# Patient Record
Sex: FEMALE | Race: WHITE | Employment: FULL TIME | URBAN - METROPOLITAN AREA
[De-identification: names, ages, dates, MRNs, and addresses within clinical notes are randomized per-mention and may not be internally consistent; named-entity substitution may affect disease eponyms.]

---

## 2017-04-10 RX ORDER — LEVOTHYROXINE SODIUM 0.05 MG/1
TABLET ORAL
Qty: 90 TABLET | Refills: 0 | Status: SHIPPED | OUTPATIENT
Start: 2017-04-10 | End: 2017-07-09

## 2017-06-10 ENCOUNTER — TELEPHONE (OUTPATIENT)
Dept: INTERNAL MEDICINE CLINIC | Facility: CLINIC | Age: 52
End: 2017-06-10

## 2017-06-10 ENCOUNTER — MOBILE ENCOUNTER (OUTPATIENT)
Dept: INTERNAL MEDICINE CLINIC | Facility: CLINIC | Age: 52
End: 2017-06-10

## 2017-06-10 RX ORDER — SULFAMETHOXAZOLE AND TRIMETHOPRIM 800; 160 MG/1; MG/1
1 TABLET ORAL 2 TIMES DAILY
Qty: 6 TABLET | Refills: 0 | OUTPATIENT
Start: 2017-06-10 | End: 2017-06-13

## 2017-06-10 NOTE — TELEPHONE ENCOUNTER
Pt called with uti symptoms. Having burning with urination, urgency and frequency for a day. This am had blood in her urine. Is going to be in [place] for the weekend, on the way to the airport when she called.  Would like to have something called in fo

## 2017-07-09 DIAGNOSIS — E03.9 ACQUIRED HYPOTHYROIDISM: Primary | ICD-10-CM

## 2017-07-10 RX ORDER — LEVOTHYROXINE SODIUM 0.05 MG/1
TABLET ORAL
Qty: 90 TABLET | Refills: 0 | Status: SHIPPED | OUTPATIENT
Start: 2017-07-10 | End: 2017-10-08

## 2017-07-10 NOTE — TELEPHONE ENCOUNTER
Clarification question: Rx already states 90 days. Do you want 30 days only? Will advise pt TSH needed.

## 2017-07-10 NOTE — TELEPHONE ENCOUNTER
Last OV pertinent to medication: 5/17/16  Last refill date: 4/10/17     #/refills: 90+0  When pt was asked to return for OV: 1 year  Upcoming appt/reason: 7/17/17      TSH   Date Value   05/19/2016 3.200 mIU/mL   04/14/2015 1.72 mIU/L   ----------  TSH W/R

## 2017-07-17 ENCOUNTER — OFFICE VISIT (OUTPATIENT)
Dept: INTERNAL MEDICINE CLINIC | Facility: CLINIC | Age: 52
End: 2017-07-17

## 2017-07-17 ENCOUNTER — APPOINTMENT (OUTPATIENT)
Dept: LAB | Age: 52
End: 2017-07-17
Attending: INTERNAL MEDICINE
Payer: COMMERCIAL

## 2017-07-17 VITALS
RESPIRATION RATE: 12 BRPM | HEART RATE: 61 BPM | WEIGHT: 125 LBS | DIASTOLIC BLOOD PRESSURE: 70 MMHG | SYSTOLIC BLOOD PRESSURE: 100 MMHG | HEIGHT: 68 IN | BODY MASS INDEX: 18.94 KG/M2 | OXYGEN SATURATION: 98 %

## 2017-07-17 DIAGNOSIS — Z00.00 ROUTINE PHYSICAL EXAMINATION: Primary | ICD-10-CM

## 2017-07-17 DIAGNOSIS — Z12.31 SCREENING MAMMOGRAM, ENCOUNTER FOR: ICD-10-CM

## 2017-07-17 DIAGNOSIS — Z00.00 ROUTINE PHYSICAL EXAMINATION: ICD-10-CM

## 2017-07-17 LAB
ALBUMIN SERPL-MCNC: 4.2 G/DL (ref 3.5–4.8)
ALP LIVER SERPL-CCNC: 55 U/L (ref 41–108)
ALT SERPL-CCNC: 21 U/L (ref 14–54)
AST SERPL-CCNC: 16 U/L (ref 15–41)
BILIRUB SERPL-MCNC: 0.8 MG/DL (ref 0.1–2)
BUN BLD-MCNC: 16 MG/DL (ref 8–20)
CALCIUM BLD-MCNC: 9.8 MG/DL (ref 8.3–10.3)
CHLORIDE: 105 MMOL/L (ref 101–111)
CHOLEST SMN-MCNC: 237 MG/DL (ref ?–200)
CO2: 28 MMOL/L (ref 22–32)
CREAT BLD-MCNC: 0.71 MG/DL (ref 0.55–1.02)
FREE T4: 1 NG/DL (ref 0.9–1.8)
GLUCOSE BLD-MCNC: 97 MG/DL (ref 70–99)
HDLC SERPL-MCNC: 83 MG/DL (ref 45–?)
HDLC SERPL: 2.86 {RATIO} (ref ?–4.44)
LDLC SERPL CALC-MCNC: 126 MG/DL (ref ?–130)
LDLC SERPL-MCNC: 28 MG/DL (ref 5–40)
M PROTEIN MFR SERPL ELPH: 7.5 G/DL (ref 6.1–8.3)
NONHDLC SERPL-MCNC: 154 MG/DL (ref ?–130)
POTASSIUM SERPL-SCNC: 4.4 MMOL/L (ref 3.6–5.1)
SODIUM SERPL-SCNC: 139 MMOL/L (ref 136–144)
TRIGLYCERIDES: 138 MG/DL (ref ?–150)
TSI SER-ACNC: 1.94 MIU/ML (ref 0.35–5.5)

## 2017-07-17 PROCEDURE — 84443 ASSAY THYROID STIM HORMONE: CPT

## 2017-07-17 PROCEDURE — 99396 PREV VISIT EST AGE 40-64: CPT | Performed by: INTERNAL MEDICINE

## 2017-07-17 PROCEDURE — 36415 COLL VENOUS BLD VENIPUNCTURE: CPT

## 2017-07-17 PROCEDURE — 80061 LIPID PANEL: CPT

## 2017-07-17 PROCEDURE — 84439 ASSAY OF FREE THYROXINE: CPT

## 2017-07-17 PROCEDURE — 80053 COMPREHEN METABOLIC PANEL: CPT

## 2017-07-17 NOTE — PROGRESS NOTES
HPI:   Deepa Lares is a 46year old female who presents for a complete physical exam.    Wt Readings from Last 6 Encounters:  07/17/17 : 125 lb  08/25/16 : 130 lb  05/17/16 : 129 lb  09/09/15 : 124 lb  04/08/15 : 125 lb  12/20/13 : 121 lb    Body mass i smoking during college  Alcohol use: Yes           0.0 oz/week     Comment: 1 drink daily     Occ: work [employer] . : yes Children: one adult    Exercise: yoga twice a week/long walks.   Diet: healthy      REVIEW OF SYSTEMS:   GENERAL: feels well clubbing or edema, right thumb no trigger, slight swelling, nontender  NEURO: Oriented times three,cranial nerves are intact, 5/5 upper and lower extremity strength, reflexes 2+ symmetric upper and lower      ASSESSMENT AND PLAN:   Kirk Mercadoma is a 46 y

## 2017-07-29 ENCOUNTER — HOSPITAL ENCOUNTER (OUTPATIENT)
Dept: MAMMOGRAPHY | Age: 52
Discharge: HOME | End: 2017-07-29
Attending: INTERNAL MEDICINE
Payer: COMMERCIAL

## 2017-07-29 DIAGNOSIS — Z12.31 SCREENING MAMMOGRAM, ENCOUNTER FOR: ICD-10-CM

## 2017-07-29 PROCEDURE — 77067 SCR MAMMO BI INCL CAD: CPT | Performed by: INTERNAL MEDICINE

## 2017-10-09 RX ORDER — LEVOTHYROXINE SODIUM 0.05 MG/1
TABLET ORAL
Qty: 90 TABLET | Refills: 0 | Status: SHIPPED | OUTPATIENT
Start: 2017-10-09 | End: 2018-01-07

## 2018-01-08 RX ORDER — LEVOTHYROXINE SODIUM 0.05 MG/1
TABLET ORAL
Qty: 90 TABLET | Refills: 1 | Status: SHIPPED | OUTPATIENT
Start: 2018-01-08 | End: 2018-07-23

## 2018-07-09 RX ORDER — LEVOTHYROXINE SODIUM 0.05 MG/1
TABLET ORAL
Qty: 90 TABLET | Refills: 1 | OUTPATIENT
Start: 2018-07-09

## 2018-07-09 NOTE — TELEPHONE ENCOUNTER
Last OV relevant to medication: 7/17/17  Last refill date: 1/8/18  #/90refills: 1  When pt was asked to return for OV:1 year  Upcoming appt/reason: No appt scheduled. Spoke with pt. She scheduled OV with JV for medication follow up.   She doesn't need re

## 2018-07-23 ENCOUNTER — OFFICE VISIT (OUTPATIENT)
Dept: INTERNAL MEDICINE CLINIC | Facility: CLINIC | Age: 53
End: 2018-07-23
Payer: COMMERCIAL

## 2018-07-23 ENCOUNTER — LABORATORY ENCOUNTER (OUTPATIENT)
Dept: LAB | Age: 53
End: 2018-07-23
Attending: NURSE PRACTITIONER
Payer: COMMERCIAL

## 2018-07-23 VITALS
BODY MASS INDEX: 19.58 KG/M2 | WEIGHT: 129.19 LBS | RESPIRATION RATE: 14 BRPM | HEIGHT: 68 IN | DIASTOLIC BLOOD PRESSURE: 60 MMHG | SYSTOLIC BLOOD PRESSURE: 90 MMHG | HEART RATE: 64 BPM | OXYGEN SATURATION: 97 % | TEMPERATURE: 99 F

## 2018-07-23 DIAGNOSIS — Z13.0 SCREENING FOR BLOOD DISEASE: ICD-10-CM

## 2018-07-23 DIAGNOSIS — Z13.228 SCREENING FOR METABOLIC DISORDER: ICD-10-CM

## 2018-07-23 DIAGNOSIS — E03.9 ACQUIRED HYPOTHYROIDISM: Primary | ICD-10-CM

## 2018-07-23 DIAGNOSIS — E03.9 ACQUIRED HYPOTHYROIDISM: ICD-10-CM

## 2018-07-23 DIAGNOSIS — Z13.220 SCREENING FOR LIPID DISORDERS: ICD-10-CM

## 2018-07-23 DIAGNOSIS — M79.605 ACUTE LEG PAIN, LEFT: ICD-10-CM

## 2018-07-23 DIAGNOSIS — Z12.39 SCREENING FOR BREAST CANCER: ICD-10-CM

## 2018-07-23 LAB
ALBUMIN SERPL-MCNC: 4.2 G/DL (ref 3.5–4.8)
ALBUMIN/GLOB SERPL: 1.2 {RATIO} (ref 1–2)
ALP LIVER SERPL-CCNC: 57 U/L (ref 41–108)
ALT SERPL-CCNC: 23 U/L (ref 14–54)
ANION GAP SERPL CALC-SCNC: 6 MMOL/L (ref 0–18)
AST SERPL-CCNC: 15 U/L (ref 15–41)
BASOPHILS # BLD AUTO: 0.05 X10(3) UL (ref 0–0.1)
BASOPHILS NFR BLD AUTO: 0.9 %
BILIRUB SERPL-MCNC: 0.7 MG/DL (ref 0.1–2)
BUN BLD-MCNC: 16 MG/DL (ref 8–20)
BUN/CREAT SERPL: 20.3 (ref 10–20)
CALCIUM BLD-MCNC: 9.6 MG/DL (ref 8.3–10.3)
CHLORIDE SERPL-SCNC: 106 MMOL/L (ref 101–111)
CHOLEST SMN-MCNC: 229 MG/DL (ref ?–200)
CO2 SERPL-SCNC: 29 MMOL/L (ref 22–32)
CREAT BLD-MCNC: 0.79 MG/DL (ref 0.55–1.02)
EOSINOPHIL # BLD AUTO: 0.22 X10(3) UL (ref 0–0.3)
EOSINOPHIL NFR BLD AUTO: 4.1 %
ERYTHROCYTE [DISTWIDTH] IN BLOOD BY AUTOMATED COUNT: 12.9 % (ref 11.5–16)
GLOBULIN PLAS-MCNC: 3.4 G/DL (ref 2.5–3.7)
GLUCOSE BLD-MCNC: 89 MG/DL (ref 70–99)
HCT VFR BLD AUTO: 43.4 % (ref 34–50)
HDLC SERPL-MCNC: 72 MG/DL (ref 40–59)
HGB BLD-MCNC: 14.3 G/DL (ref 12–16)
IMMATURE GRANULOCYTE COUNT: 0.02 X10(3) UL (ref 0–1)
IMMATURE GRANULOCYTE RATIO %: 0.4 %
LDLC SERPL CALC-MCNC: 119 MG/DL (ref ?–100)
LYMPHOCYTES # BLD AUTO: 2.33 X10(3) UL (ref 0.9–4)
LYMPHOCYTES NFR BLD AUTO: 43 %
M PROTEIN MFR SERPL ELPH: 7.6 G/DL (ref 6.1–8.3)
MCH RBC QN AUTO: 31.5 PG (ref 27–33.2)
MCHC RBC AUTO-ENTMCNC: 32.9 G/DL (ref 31–37)
MCV RBC AUTO: 95.6 FL (ref 81–100)
MONOCYTES # BLD AUTO: 0.52 X10(3) UL (ref 0.1–1)
MONOCYTES NFR BLD AUTO: 9.6 %
NEUTROPHIL ABS PRELIM: 2.28 X10 (3) UL (ref 1.3–6.7)
NEUTROPHILS # BLD AUTO: 2.28 X10(3) UL (ref 1.3–6.7)
NEUTROPHILS NFR BLD AUTO: 42 %
NONHDLC SERPL-MCNC: 157 MG/DL (ref ?–130)
OSMOLALITY SERPL CALC.SUM OF ELEC: 293 MOSM/KG (ref 275–295)
PLATELET # BLD AUTO: 249 10(3)UL (ref 150–450)
POTASSIUM SERPL-SCNC: 4 MMOL/L (ref 3.6–5.1)
RBC # BLD AUTO: 4.54 X10(6)UL (ref 3.8–5.1)
RED CELL DISTRIBUTION WIDTH-SD: 44.9 FL (ref 35.1–46.3)
SODIUM SERPL-SCNC: 141 MMOL/L (ref 136–144)
TRIGL SERPL-MCNC: 188 MG/DL (ref 30–149)
TSI SER-ACNC: 5 MIU/ML (ref 0.35–5.5)
VLDLC SERPL CALC-MCNC: 38 MG/DL (ref 0–30)
WBC # BLD AUTO: 5.4 X10(3) UL (ref 4–13)

## 2018-07-23 PROCEDURE — 80061 LIPID PANEL: CPT

## 2018-07-23 PROCEDURE — 85025 COMPLETE CBC W/AUTO DIFF WBC: CPT

## 2018-07-23 PROCEDURE — 80053 COMPREHEN METABOLIC PANEL: CPT

## 2018-07-23 PROCEDURE — 36415 COLL VENOUS BLD VENIPUNCTURE: CPT

## 2018-07-23 PROCEDURE — 99213 OFFICE O/P EST LOW 20 MIN: CPT | Performed by: NURSE PRACTITIONER

## 2018-07-23 PROCEDURE — 84443 ASSAY THYROID STIM HORMONE: CPT

## 2018-07-23 RX ORDER — LEVOTHYROXINE SODIUM 0.05 MG/1
50 TABLET ORAL
Qty: 90 TABLET | Refills: 1 | Status: SHIPPED | OUTPATIENT
Start: 2018-07-23 | End: 2019-09-12

## 2018-07-23 RX ORDER — CHLORAL HYDRATE 500 MG
1000 CAPSULE ORAL DAILY
COMMUNITY

## 2018-07-23 RX ORDER — BIOTIN 10 MG
2500 TABLET ORAL DAILY
COMMUNITY
End: 2020-08-19 | Stop reason: ALTCHOICE

## 2018-07-23 NOTE — PROGRESS NOTES
Patient presents with:  Medication Follow-Up      HPI:  Presents with approx 3 month history of left posterior leg pain, feel she \"pulled\" it while exercising. Denies redness, swelling weakness of leg. Has been treating with rest with little improvement. A/P:    Acquired hypothyroidism  (primary encounter diagnosis)- Check TSH. Continue levothyroxine for now. Acute leg pain, left- trial Aleve BID as below (refused script for naproxen). Call office if no improvement.      Patient to schedule CPE soon

## 2018-07-23 NOTE — PATIENT INSTRUCTIONS
Take Aleve 2 tabs, twice daily, for 1 week. Space doses 12 hours apart for best effect. TAKE WITH FOOD. Do not take any Advil, Motrin or ibuprofen products while taking this medication.          It is ok to take acetaminophen (Tyle

## 2018-07-26 PROBLEM — E78.5 DYSLIPIDEMIA: Status: ACTIVE | Noted: 2018-07-26

## 2018-07-27 ENCOUNTER — OFFICE VISIT (OUTPATIENT)
Dept: INTERNAL MEDICINE CLINIC | Facility: CLINIC | Age: 53
End: 2018-07-27
Payer: COMMERCIAL

## 2018-07-27 VITALS
HEIGHT: 68 IN | WEIGHT: 129 LBS | BODY MASS INDEX: 19.55 KG/M2 | DIASTOLIC BLOOD PRESSURE: 74 MMHG | SYSTOLIC BLOOD PRESSURE: 110 MMHG | TEMPERATURE: 99 F | RESPIRATION RATE: 14 BRPM | HEART RATE: 66 BPM | OXYGEN SATURATION: 94 %

## 2018-07-27 DIAGNOSIS — Z00.00 WELL WOMAN EXAM (NO GYNECOLOGICAL EXAM): Primary | ICD-10-CM

## 2018-07-27 DIAGNOSIS — E03.9 ACQUIRED HYPOTHYROIDISM: ICD-10-CM

## 2018-07-27 DIAGNOSIS — E78.5 DYSLIPIDEMIA: ICD-10-CM

## 2018-07-27 DIAGNOSIS — M79.605 LEFT LEG PAIN: ICD-10-CM

## 2018-07-27 PROCEDURE — 99396 PREV VISIT EST AGE 40-64: CPT | Performed by: NURSE PRACTITIONER

## 2018-07-27 NOTE — PROGRESS NOTES
Shalom Gil is a 45year old female who presents for a complete physical exam:       Patient complains of:  Left hamstring pain for several months. Is typically very active with yoga and jogging. Active has been limited due to soreness.  Denies left leg daily. Disp:  Rfl:    Rhodiola rosea (RHODIOLA OR) Take 250 mg by mouth daily. Disp:  Rfl:    Biotin 10 MG Oral Tab Take 2,500 mcg by mouth daily. Disp:  Rfl:    Levothyroxine Sodium 50 MCG Oral Tab Take 1 tablet (50 mcg total) by mouth once daily.  Disp: completed. REVIEW OF SYSTEMS:   GENERAL: feels well otherwise.  No fever, chills, malaise  SKIN: denies any unusual skin lesions, rash or pruritis  EYES: denies blurred/double vision, light sensitivity or visual disturbances  HEENT: denies nasal congest MAINTENANCE:     Well woman exam (no gynecological exam)  (primary encounter diagnosis)-  adult female in her usual state of health. Encouraged to eat healthy diet as tolerated.      Left leg pain- discussed resuming activity at low levels and rebu

## 2018-07-30 ENCOUNTER — PATIENT MESSAGE (OUTPATIENT)
Dept: INTERNAL MEDICINE CLINIC | Facility: CLINIC | Age: 53
End: 2018-07-30

## 2018-07-31 NOTE — TELEPHONE ENCOUNTER
From: Jaylen Fajardo  To: Tita Sanchez NP  Sent: 7/30/2018 11:24 AM CDT  Subject: Visit Follow-up Question    Coral Massey,    During my visit last Friday 7/27 we discussed my borderline cholesterol and the potential long-term benefits of statins.  I am in

## 2018-08-09 RX ORDER — ROSUVASTATIN CALCIUM 5 MG/1
5 TABLET, COATED ORAL NIGHTLY
Qty: 90 TABLET | Refills: 0 | Status: SHIPPED | OUTPATIENT
Start: 2018-08-09 | End: 2018-10-25

## 2018-08-09 NOTE — TELEPHONE ENCOUNTER
See patient email. Patient has decided to try the Rosuvastatin. Medication pended to you for 5mg, 90 day supply to mail order. Please double check before approving. Any labs to be ordered? Thank you. Routed to [facility].

## 2018-08-20 ENCOUNTER — TELEPHONE (OUTPATIENT)
Dept: INTERNAL MEDICINE CLINIC | Facility: CLINIC | Age: 53
End: 2018-08-20

## 2018-08-20 ENCOUNTER — OFFICE VISIT (OUTPATIENT)
Dept: FAMILY MEDICINE CLINIC | Facility: CLINIC | Age: 53
End: 2018-08-20
Payer: COMMERCIAL

## 2018-08-20 VITALS
HEART RATE: 80 BPM | DIASTOLIC BLOOD PRESSURE: 76 MMHG | RESPIRATION RATE: 18 BRPM | SYSTOLIC BLOOD PRESSURE: 118 MMHG | TEMPERATURE: 98 F

## 2018-08-20 DIAGNOSIS — R31.9 HEMATURIA, UNSPECIFIED TYPE: Primary | ICD-10-CM

## 2018-08-20 LAB
APPEARANCE: CLEAR
BILIRUBIN: NEGATIVE
GLUCOSE (URINE DIPSTICK): NEGATIVE MG/DL
KETONES (URINE DIPSTICK): NEGATIVE MG/DL
MULTISTIX LOT#: NORMAL NUMERIC
NITRITE, URINE: NEGATIVE
OCCULT BLOOD: NEGATIVE
PH, URINE: 7 (ref 4.5–8)
PROTEIN (URINE DIPSTICK): NEGATIVE MG/DL
SPECIFIC GRAVITY: 1.01 (ref 1–1.03)
UROBILINOGEN,SEMI-QN: 0.2 MG/DL (ref 0–1.9)

## 2018-08-20 PROCEDURE — 87086 URINE CULTURE/COLONY COUNT: CPT | Performed by: NURSE PRACTITIONER

## 2018-08-20 PROCEDURE — 81003 URINALYSIS AUTO W/O SCOPE: CPT | Performed by: NURSE PRACTITIONER

## 2018-08-20 PROCEDURE — 99213 OFFICE O/P EST LOW 20 MIN: CPT | Performed by: NURSE PRACTITIONER

## 2018-08-20 NOTE — PATIENT INSTRUCTIONS
What is Hematuria? Blood in your urine is a condition known as hematuria. Most of the time, the cause of hematuria is not serious. But, never ignore blood in the urine.  Your doctor can evaluate you to find the cause of the bleeding and treat it, if need © 0722-8857 [employer] [address] [facility], [facility] [place]. All rights reserved. This information is not intended as a substitute for professional medical care. Always follow your healthcare professional's instructions.

## 2018-08-20 NOTE — PROGRESS NOTES
CHIEF COMPLAINT:   Patient presents with:  Urinary Symptoms (urologic): had 3 episodes of pink urine, and spotting on toliet paper. HPI:   Joel Leavitt is a 45year old female who presents with symptoms of UTI.  Complaining of pink urine with visib GENERAL: Denies fever, chills, or body aches; usual appetite  SKIN: no rashes, no skin wounds or ulcers.   EYES:denies blurred vision or double vision  HEENT: no congestion, rhinorrhea, sore throat or ear pain  CARDIOVASCULAR: denies chest pain or palpitati Gualberto Gil is a 45year old female presents with hematuria 3 days ago and again this morning, no blood  present in urine dip. Denies urinary frequency, urgency and burning. Before, during or after urination.  Will send urine specimen for C/S and treat as Both types of hematuria can have the same causes. Neither one is necessarily more serious than the other. With either type, you may have other symptoms, such as pain, pressure, or burning when you urinate, abdominal pain, or back pain.  Or, you may not have

## 2018-08-20 NOTE — TELEPHONE ENCOUNTER
Spoke with pt, complaint of \"pink blood\" which she states is usually first sign that UTI starting. Denied any other urinary complaints. Pt needed late afternoon/evening appt. No immediate availability.  Recommended to go to [facility] as they have later hours an

## 2018-08-22 ENCOUNTER — OFFICE VISIT (OUTPATIENT)
Dept: OBGYN CLINIC | Facility: CLINIC | Age: 53
End: 2018-08-22
Payer: COMMERCIAL

## 2018-08-22 ENCOUNTER — TELEPHONE (OUTPATIENT)
Dept: FAMILY MEDICINE CLINIC | Facility: CLINIC | Age: 53
End: 2018-08-22

## 2018-08-22 VITALS
DIASTOLIC BLOOD PRESSURE: 72 MMHG | BODY MASS INDEX: 19.7 KG/M2 | WEIGHT: 130 LBS | RESPIRATION RATE: 20 BRPM | HEIGHT: 68 IN | HEART RATE: 60 BPM | SYSTOLIC BLOOD PRESSURE: 110 MMHG | TEMPERATURE: 99 F

## 2018-08-22 DIAGNOSIS — Z12.4 SCREENING FOR MALIGNANT NEOPLASM OF CERVIX: ICD-10-CM

## 2018-08-22 DIAGNOSIS — Z01.419 ENCOUNTER FOR ANNUAL ROUTINE GYNECOLOGICAL EXAMINATION: Primary | ICD-10-CM

## 2018-08-22 DIAGNOSIS — N89.8 VAGINAL ITCHING: ICD-10-CM

## 2018-08-22 DIAGNOSIS — Z11.51 SCREENING FOR HUMAN PAPILLOMAVIRUS: ICD-10-CM

## 2018-08-22 PROCEDURE — 87510 GARDNER VAG DNA DIR PROBE: CPT | Performed by: OBSTETRICS & GYNECOLOGY

## 2018-08-22 PROCEDURE — 87624 HPV HI-RISK TYP POOLED RSLT: CPT | Performed by: OBSTETRICS & GYNECOLOGY

## 2018-08-22 PROCEDURE — 88175 CYTOPATH C/V AUTO FLUID REDO: CPT | Performed by: OBSTETRICS & GYNECOLOGY

## 2018-08-22 PROCEDURE — 87480 CANDIDA DNA DIR PROBE: CPT | Performed by: OBSTETRICS & GYNECOLOGY

## 2018-08-22 PROCEDURE — 87660 TRICHOMONAS VAGIN DIR PROBE: CPT | Performed by: OBSTETRICS & GYNECOLOGY

## 2018-08-22 PROCEDURE — 99386 PREV VISIT NEW AGE 40-64: CPT | Performed by: OBSTETRICS & GYNECOLOGY

## 2018-08-22 NOTE — PROGRESS NOTES
HPI:   Alexander Tyson is a 45year old  who presents for an annual gynecological exam.   Menses: Patient's last menstrual period was 2017. Menopause: postmenopausal  Her last Pap was 3 years ago.   Several weeks ago when she wiped she noticed a sp Cigarettes  Smokeless tobacco: Never Used                      Comment: social smoking during college  Alcohol use: Yes           0.0 oz/week     Comment: x2 drinks every 3 days       REVIEW OF SYSTEMS:   CONSTITUTIONAL: generally feels well   SKIN: denies globular, well supported  ADNEXAE: Normal, no masses, no tenderness  ANUS: No lesions, no masses  PSYCHIATRIC: Patient oriented to time, place and person; mood and affect appropriate    DISCUSSION:  ·  I encouraged incorporating 4x weekly cardiovascular ac Future    Instructed to be seen should she notice any more streaks of blood on toilet paper with wiping

## 2018-08-23 LAB — HPV I/H RISK 1 DNA SPEC QL NAA+PROBE: NEGATIVE

## 2018-09-01 ENCOUNTER — HOSPITAL ENCOUNTER (OUTPATIENT)
Dept: MAMMOGRAPHY | Age: 53
Discharge: HOME | End: 2018-09-01
Attending: NURSE PRACTITIONER
Payer: COMMERCIAL

## 2018-09-01 DIAGNOSIS — Z12.39 SCREENING FOR BREAST CANCER: ICD-10-CM

## 2018-09-01 PROCEDURE — 77063 BREAST TOMOSYNTHESIS BI: CPT | Performed by: NURSE PRACTITIONER

## 2018-09-01 PROCEDURE — 77067 SCR MAMMO BI INCL CAD: CPT | Performed by: NURSE PRACTITIONER

## 2018-10-26 RX ORDER — ROSUVASTATIN CALCIUM 5 MG/1
5 TABLET, COATED ORAL NIGHTLY
Qty: 90 TABLET | Refills: 0 | Status: SHIPPED | OUTPATIENT
Start: 2018-10-26 | End: 2019-02-22

## 2018-10-29 RX ORDER — ROSUVASTATIN CALCIUM 5 MG/1
TABLET, COATED ORAL
Qty: 90 TABLET | Refills: 0 | OUTPATIENT
Start: 2018-10-29

## 2019-01-22 ENCOUNTER — OFFICE VISIT (OUTPATIENT)
Dept: FAMILY MEDICINE CLINIC | Facility: CLINIC | Age: 54
End: 2019-01-22
Payer: COMMERCIAL

## 2019-01-22 VITALS
TEMPERATURE: 98 F | RESPIRATION RATE: 16 BRPM | DIASTOLIC BLOOD PRESSURE: 70 MMHG | OXYGEN SATURATION: 98 % | HEART RATE: 71 BPM | SYSTOLIC BLOOD PRESSURE: 139 MMHG

## 2019-01-22 DIAGNOSIS — N30.90 BLADDER INFECTION: ICD-10-CM

## 2019-01-22 DIAGNOSIS — R30.0 DYSURIA: Primary | ICD-10-CM

## 2019-01-22 LAB
APPEARANCE: CLEAR
BILIRUBIN: NEGATIVE
GLUCOSE (URINE DIPSTICK): NEGATIVE MG/DL
KETONES (URINE DIPSTICK): NEGATIVE MG/DL
MULTISTIX LOT#: ABNORMAL NUMERIC
NITRITE, URINE: NEGATIVE
PH, URINE: 6.5 (ref 4.5–8)
SPECIFIC GRAVITY: 1.01 (ref 1–1.03)
URINE-COLOR: YELLOW
UROBILINOGEN,SEMI-QN: 0.2 MG/DL (ref 0–1.9)

## 2019-01-22 PROCEDURE — 81003 URINALYSIS AUTO W/O SCOPE: CPT | Performed by: NURSE PRACTITIONER

## 2019-01-22 PROCEDURE — 87086 URINE CULTURE/COLONY COUNT: CPT | Performed by: NURSE PRACTITIONER

## 2019-01-22 PROCEDURE — 87088 URINE BACTERIA CULTURE: CPT | Performed by: NURSE PRACTITIONER

## 2019-01-22 PROCEDURE — 87186 SC STD MICRODIL/AGAR DIL: CPT | Performed by: NURSE PRACTITIONER

## 2019-01-22 PROCEDURE — 99213 OFFICE O/P EST LOW 20 MIN: CPT | Performed by: NURSE PRACTITIONER

## 2019-01-22 RX ORDER — NITROFURANTOIN 25; 75 MG/1; MG/1
CAPSULE ORAL
Qty: 14 CAPSULE | Refills: 0 | Status: SHIPPED | OUTPATIENT
Start: 2019-01-22 | End: 2019-08-30 | Stop reason: ALTCHOICE

## 2019-01-22 NOTE — PROGRESS NOTES
CHIEF COMPLAINT:   Patient presents with:  UTI: Symptoms x3 days. HPI:   Oanh Paul is a 52year old female who presents with symptoms of UTI. Complaining of urinary frequency, urgency, and dysuria for last 3 days.  Symptoms have been worseninng si HEENT: no congestion, rhinorrhea, sore throat or ear pain  CARDIOVASCULAR: denies chest pain or palpitations  LUNGS: denies shortness of breath, cough, or wheezing  GI: See HPI. No N/V/C/D. : See HPI. NEURO: no headaches.     EXAM:   /70   Pulse • Nitrofurantoin Monohyd Macro 100 MG Oral Cap 14 capsule 0     Sig: Take one capsule PO BID for 7 days       Risk and benefits of medication discussed. Stressed importance of completing full course of antibiotic. Pt verbalizes understanding.     Patient In The most common cause of bladder infections is bacteria from the bowels. The bacteria get onto the skin around the opening of the urethra. From there, they can get into the urine and travel up to the bladder, causing inflammation and infection.  This usuall · Urinate more often. Don't try to hold urine in for a long time. · Wear loose-fitting clothes and cotton underwear. Avoid tight-fitting pants. · Improve your diet and prevent constipation.  Eat more fresh fruit and vegetables, and fiber, and less junk an

## 2019-02-22 RX ORDER — ROSUVASTATIN CALCIUM 5 MG/1
5 TABLET, COATED ORAL NIGHTLY
Qty: 90 TABLET | Refills: 0 | Status: CANCELLED | OUTPATIENT
Start: 2019-02-22

## 2019-02-22 RX ORDER — ROSUVASTATIN CALCIUM 5 MG/1
5 TABLET, COATED ORAL NIGHTLY
Qty: 90 TABLET | Refills: 0 | Status: SHIPPED | OUTPATIENT
Start: 2019-02-22 | End: 2019-06-05

## 2019-04-17 RX ORDER — ROSUVASTATIN CALCIUM 5 MG/1
TABLET, COATED ORAL
Qty: 90 TABLET | Refills: 0 | OUTPATIENT
Start: 2019-04-17

## 2019-06-05 DIAGNOSIS — E78.5 DYSLIPIDEMIA: Primary | ICD-10-CM

## 2019-06-05 RX ORDER — ROSUVASTATIN CALCIUM 5 MG/1
TABLET, COATED ORAL
Qty: 90 TABLET | Refills: 0 | Status: SHIPPED | OUTPATIENT
Start: 2019-06-05 | End: 2019-09-12

## 2019-07-29 DIAGNOSIS — E78.5 DYSLIPIDEMIA: ICD-10-CM

## 2019-07-29 RX ORDER — ROSUVASTATIN CALCIUM 5 MG/1
TABLET, COATED ORAL
Qty: 90 TABLET | Refills: 0 | OUTPATIENT
Start: 2019-07-29

## 2019-08-30 ENCOUNTER — OFFICE VISIT (OUTPATIENT)
Dept: INTERNAL MEDICINE CLINIC | Facility: CLINIC | Age: 54
End: 2019-08-30
Payer: COMMERCIAL

## 2019-08-30 VITALS
DIASTOLIC BLOOD PRESSURE: 72 MMHG | WEIGHT: 130.38 LBS | HEART RATE: 56 BPM | OXYGEN SATURATION: 95 % | SYSTOLIC BLOOD PRESSURE: 114 MMHG | RESPIRATION RATE: 14 BRPM | HEIGHT: 67.25 IN | BODY MASS INDEX: 20.23 KG/M2

## 2019-08-30 DIAGNOSIS — Z13.0 SCREENING FOR IRON DEFICIENCY ANEMIA: ICD-10-CM

## 2019-08-30 DIAGNOSIS — Z00.00 ENCOUNTER FOR ANNUAL PHYSICAL EXAM: Primary | ICD-10-CM

## 2019-08-30 DIAGNOSIS — Z13.228 SCREENING FOR METABOLIC DISORDER: ICD-10-CM

## 2019-08-30 DIAGNOSIS — E55.9 VITAMIN D DEFICIENCY: ICD-10-CM

## 2019-08-30 DIAGNOSIS — E78.5 DYSLIPIDEMIA: ICD-10-CM

## 2019-08-30 DIAGNOSIS — Z13.1 SCREENING FOR DIABETES MELLITUS: ICD-10-CM

## 2019-08-30 DIAGNOSIS — Z12.31 ENCOUNTER FOR SCREENING MAMMOGRAM FOR BREAST CANCER: ICD-10-CM

## 2019-08-30 DIAGNOSIS — E03.9 ACQUIRED HYPOTHYROIDISM: ICD-10-CM

## 2019-08-30 PROBLEM — N89.8 VAGINAL ITCHING: Status: RESOLVED | Noted: 2018-08-22 | Resolved: 2019-08-30

## 2019-08-30 PROCEDURE — 99396 PREV VISIT EST AGE 40-64: CPT | Performed by: STUDENT IN AN ORGANIZED HEALTH CARE EDUCATION/TRAINING PROGRAM

## 2019-08-30 NOTE — PROGRESS NOTES
[facility]    REASON FOR VISIT:    Real Andrew is a 62year old female who presents for an [address]. This is a new patient to me.    Patient's medications, allergies, past medical, surgical, social and family histories were rev with Risk Recommendation Internal Lab or Procedure External Lab or Procedure   Cholesterol Screening Recommended screening varies with age, risk and gender LDL Cholesterol (mg/dL)   Date Value   07/23/2018 119 (H)     LDL-CHOLESTEROL (mg/dL (calc))   Date COLONOSCOPY  1/17/14      Family History   Problem Relation Age of Onset   • Breast Cancer Paternal Aunt 43   • Diabetes Father         type 2 DM   • Other (gout) Father         like vodka   • Diabetes Mother    • Lipids Mother    • Dementia Mother    • Ot appears her stated age, nourished, and pleasant. Vital signs reviewed as noted  Head: Normocephalic and atraumatic. Nose: Nose normal.   Eyes: EOM are normal. Pupils are equal, round, and reactive to light. No scleral icterus.    Throat: no oropharyngeal immunizations were discussed with the patient and ordered as follows:    Rai Lucero was seen today for physical.    Diagnoses and all orders for this visit:    Encounter for annual physical exam  Body mass index is Body mass index is 20.27 kg/m².   Recommended The patient indicates understanding of these issues and agrees to the plan.     Diet counseling perfomed  Exercise counseling perfomed    SUGGESTED VACCINATIONS - Influenza, Pneumococcal, Zoster, Tetanus     Immunization History   Administered Date(s) Adm

## 2019-08-30 NOTE — PATIENT INSTRUCTIONS
Prevention Guidelines, Women Ages 48 to 59  Screening tests and vaccines are an important part of managing your health. A screening test is done to find possible disorders or diseases in people who don't have any symptoms.  The goal is to find a disea Chlamydia Women at increased risk for infection At routine exams   Colorectal cancer All women in this age group Flexible sigmoidoscopy every 5 years, or colonoscopy every 10 years, or double-contrast barium enema every 5 years; yearly fecal occult blood t Hepatitis B Women at increased risk for infection – talk with your healthcare provider 3 doses over 6 months; second dose should be given 1 month after the first dose; the third dose should be given at least 2 months after the second dose and at least 4 mo Use of daily aspirin Women ages 54 and up in this age group who are at risk for cardiovascular health problems such as stroke When your risk is known   Use of tobacco and the health effects it can cause All women in this age group Every exam   [address]

## 2019-08-31 LAB
ABSOLUTE BASOPHILS: 31 CELLS/UL (ref 0–200)
ABSOLUTE EOSINOPHILS: 120 CELLS/UL (ref 15–500)
ABSOLUTE LYMPHOCYTES: 2319 CELLS/UL (ref 850–3900)
ABSOLUTE MONOCYTES: 494 CELLS/UL (ref 200–950)
ABSOLUTE NEUTROPHILS: 2236 CELLS/UL (ref 1500–7800)
ALBUMIN/GLOBULIN RATIO: 1.9 (CALC) (ref 1–2.5)
ALBUMIN: 4.8 G/DL (ref 3.6–5.1)
ALKALINE PHOSPHATASE: 58 U/L (ref 33–130)
ALT: 14 U/L (ref 6–29)
AST: 16 U/L (ref 10–35)
BASOPHILS: 0.6 %
BILIRUBIN, TOTAL: 0.9 MG/DL (ref 0.2–1.2)
BUN: 18 MG/DL (ref 7–25)
CALCIUM: 10.2 MG/DL (ref 8.6–10.4)
CARBON DIOXIDE: 27 MMOL/L (ref 20–32)
CHLORIDE: 100 MMOL/L (ref 98–110)
CHOL/HDLC RATIO: 2.3 (CALC)
CHOLESTEROL, TOTAL: 194 MG/DL
CREATININE: 0.78 MG/DL (ref 0.5–1.05)
EGFR IF AFRICN AM: 98 ML/MIN/1.73M2
EGFR IF NONAFRICN AM: 85 ML/MIN/1.73M2
EOSINOPHILS: 2.3 %
GLOBULIN: 2.5 G/DL (CALC) (ref 1.9–3.7)
GLUCOSE: 99 MG/DL (ref 65–99)
HDL CHOLESTEROL: 84 MG/DL
HEMATOCRIT: 42 % (ref 35–45)
HEMOGLOBIN A1C: 5.1 % OF TOTAL HGB
HEMOGLOBIN: 14.2 G/DL (ref 11.7–15.5)
LDL-CHOLESTEROL: 87 MG/DL (CALC)
LYMPHOCYTES: 44.6 %
MCH: 30.7 PG (ref 27–33)
MCHC: 33.8 G/DL (ref 32–36)
MCV: 90.7 FL (ref 80–100)
MONOCYTES: 9.5 %
MPV: 11.4 FL (ref 7.5–12.5)
NEUTROPHILS: 43 %
NON-HDL CHOLESTEROL: 110 MG/DL (CALC)
PLATELET COUNT: 239 THOUSAND/UL (ref 140–400)
POTASSIUM: 4.4 MMOL/L (ref 3.5–5.3)
PROTEIN, TOTAL: 7.3 G/DL (ref 6.1–8.1)
RDW: 12.8 % (ref 11–15)
RED BLOOD CELL COUNT: 4.63 MILLION/UL (ref 3.8–5.1)
SODIUM: 136 MMOL/L (ref 135–146)
TRIGLYCERIDES: 124 MG/DL
TSH W/REFLEX TO FT4: 1.51 MIU/L (ref 0.4–4.5)
VITAMIN D, 25-OH, TOTAL: 27 NG/ML (ref 30–100)
WHITE BLOOD CELL COUNT: 5.2 THOUSAND/UL (ref 3.8–10.8)

## 2019-09-03 ENCOUNTER — PATIENT MESSAGE (OUTPATIENT)
Dept: INTERNAL MEDICINE CLINIC | Facility: CLINIC | Age: 54
End: 2019-09-03

## 2019-09-03 NOTE — TELEPHONE ENCOUNTER
From: Jarad Gordon  To: Sandrine Christensen MD  Sent: 9/3/2019 8:32 AM CDT  Subject: Non-Urgent Medical Question    I have a question about COMP METABOLIC PANEL (14) resulted on 8/31/19, 6:12 AM.    Any concern with the glucose level at the upper limit?  Hist

## 2019-09-03 NOTE — PROGRESS NOTES
Pt has 2 concerns regarding labs:    1) asking about your opinion on efficacy of SL vit D tincture vs prescription gel caps (ordered)    2) has concerns about her fasting glucose being 99. a1c measurement discussed.  Pt feels already follow strict diet limi

## 2019-09-05 NOTE — TELEPHONE ENCOUNTER
I do not have any information about Vitamin D tincture, but in my opinion it is easier to take vitamin D capsule once a week instead.    Her fasting blood glucose and hemoglobin A1c are absolutely normal.  She does not want her fasting glucose be too low an

## 2019-09-12 DIAGNOSIS — E78.5 DYSLIPIDEMIA: ICD-10-CM

## 2019-09-12 DIAGNOSIS — E03.9 ACQUIRED HYPOTHYROIDISM: Primary | ICD-10-CM

## 2019-09-12 RX ORDER — LEVOTHYROXINE SODIUM 0.05 MG/1
50 TABLET ORAL
Qty: 90 TABLET | Refills: 3 | Status: SHIPPED | OUTPATIENT
Start: 2019-09-12 | End: 2020-08-10

## 2019-09-12 RX ORDER — ROSUVASTATIN CALCIUM 5 MG/1
5 TABLET, COATED ORAL NIGHTLY
Qty: 90 TABLET | Refills: 3 | Status: SHIPPED | OUTPATIENT
Start: 2019-09-12 | End: 2020-08-10

## 2019-09-14 ENCOUNTER — HOSPITAL ENCOUNTER (OUTPATIENT)
Dept: MAMMOGRAPHY | Age: 54
Discharge: HOME | End: 2019-09-14
Attending: STUDENT IN AN ORGANIZED HEALTH CARE EDUCATION/TRAINING PROGRAM
Payer: COMMERCIAL

## 2019-09-14 DIAGNOSIS — Z12.31 ENCOUNTER FOR SCREENING MAMMOGRAM FOR BREAST CANCER: ICD-10-CM

## 2019-09-14 PROCEDURE — 77067 SCR MAMMO BI INCL CAD: CPT | Performed by: STUDENT IN AN ORGANIZED HEALTH CARE EDUCATION/TRAINING PROGRAM

## 2019-09-14 PROCEDURE — 77063 BREAST TOMOSYNTHESIS BI: CPT | Performed by: STUDENT IN AN ORGANIZED HEALTH CARE EDUCATION/TRAINING PROGRAM

## 2020-04-16 ENCOUNTER — TELEMEDICINE (OUTPATIENT)
Dept: INTERNAL MEDICINE CLINIC | Facility: CLINIC | Age: 55
End: 2020-04-16

## 2020-04-16 ENCOUNTER — TELEPHONE (OUTPATIENT)
Dept: INTERNAL MEDICINE CLINIC | Facility: CLINIC | Age: 55
End: 2020-04-16

## 2020-04-16 DIAGNOSIS — R22.41 LOCALIZED SWELLING OF RIGHT LOWER LEG: ICD-10-CM

## 2020-04-16 DIAGNOSIS — S86.811A STRAIN OF CALF MUSCLE, RIGHT, INITIAL ENCOUNTER: Primary | ICD-10-CM

## 2020-04-16 PROCEDURE — 99213 OFFICE O/P EST LOW 20 MIN: CPT | Performed by: STUDENT IN AN ORGANIZED HEALTH CARE EDUCATION/TRAINING PROGRAM

## 2020-04-16 NOTE — TELEPHONE ENCOUNTER
Patient scheduled a VV visit to discuss her swollen leg with Dr Zayda Fleming on April 16 at 2:30 . Virtual/Telephone Check-In    Tawny Runner Derse verbally consents to a Virtual/Telephone Check-In service on 4/16/20.   Patient understands and accepts financial

## 2020-04-16 NOTE — PROGRESS NOTES
[facility]    REASON FOR VISIT:      HPI: A standard face-to-face encounter was substituted to a video visit today due to public health safety risk related to ongoing COVID-19 pandemic.     Lis Field is a 62year old female who presents for v Neurological: Negative for headaches, dizziness, extremity weakness. HENT: Negative for hearing loss, congestion, sore throat and neck pain. Eyes: Negative for pain and visual disturbance. Respiratory: Negative for cough and shortness of breath. she reported is negative. There is full range of motion of the right ankle, no swelling of the ankle. Upon self palpation to the right calf patient points at the mid upper calf point tenderness.   The area of tenderness has intact skin, no edema or erythe limitations of this visit as no complete physical exam could be performed. Every conscious effort was taken to allow for sufficient and adequate time. Appropriate medical decision-making is detailed in the plan of care above.     Imaging & Consults:  None

## 2020-04-16 NOTE — PATIENT INSTRUCTIONS
MA spoke to pt and scheduled ultrasound for tomorrow night at 7:15pm. Pt confirmed appt.EMS   Muscle Strain in the Extremities  A muscle strain is a stretching and tearing of muscle fibers. This causes pain, especially when you move that muscle. There may also be some swelling and bruising.   Home care  · Keep the hurt area raised above heart leve

## 2020-07-27 DIAGNOSIS — E03.9 ACQUIRED HYPOTHYROIDISM: ICD-10-CM

## 2020-07-27 DIAGNOSIS — E78.5 DYSLIPIDEMIA: ICD-10-CM

## 2020-07-29 NOTE — TELEPHONE ENCOUNTER
Left Message's for Patient to call back and schedule her Physical Appointment that's due next month in order to Release Rx even though it Passes Protocol at this time.

## 2020-08-03 RX ORDER — LEVOTHYROXINE SODIUM 0.05 MG/1
TABLET ORAL
Qty: 90 TABLET | Refills: 0 | OUTPATIENT
Start: 2020-08-03

## 2020-08-03 RX ORDER — ROSUVASTATIN CALCIUM 5 MG/1
TABLET, COATED ORAL
Qty: 90 TABLET | Refills: 0 | OUTPATIENT
Start: 2020-08-03

## 2020-08-03 NOTE — TELEPHONE ENCOUNTER
LM for patient to call back and schedule PE due this month. Appt needs to be made to receive more refills. Rx denied at this time.     Last OV relevant to medication: 8/30/2019 - PE  Last refill date: 9/12/2019     #/refills: 90/3  When pt was asked to re

## 2020-08-07 NOTE — TELEPHONE ENCOUNTER
Patient returned call and is concerned about coming in to a doctor's office as well as doing labs at [facility] during this time. She said her  has 4 comorbidity factors and both of them have been self isolating.   She is requesting a 6 month supply at t

## 2020-08-10 RX ORDER — LEVOTHYROXINE SODIUM 0.05 MG/1
50 TABLET ORAL
Qty: 30 TABLET | Refills: 0 | Status: SHIPPED | OUTPATIENT
Start: 2020-08-10 | End: 2020-08-19

## 2020-08-10 RX ORDER — ROSUVASTATIN CALCIUM 5 MG/1
5 TABLET, COATED ORAL NIGHTLY
Qty: 30 TABLET | Refills: 0 | Status: SHIPPED | OUTPATIENT
Start: 2020-08-10 | End: 2020-08-19

## 2020-08-10 NOTE — TELEPHONE ENCOUNTER
Patient scheduled a TV on August 19th at 11:00 to discuss her medications. Please call her at [phone]. Virtual/Telephone Consent    Tristin Fajardo verbally consents to a Virtual/Telephone Check-In service on 8/19/20.   Patient understands and accep

## 2020-08-19 ENCOUNTER — VIRTUAL PHONE E/M (OUTPATIENT)
Dept: INTERNAL MEDICINE CLINIC | Facility: CLINIC | Age: 55
End: 2020-08-19
Payer: COMMERCIAL

## 2020-08-19 DIAGNOSIS — Z13.228 SCREENING FOR METABOLIC DISORDER: ICD-10-CM

## 2020-08-19 DIAGNOSIS — E78.5 DYSLIPIDEMIA: ICD-10-CM

## 2020-08-19 DIAGNOSIS — Z13.1 SCREENING FOR DIABETES MELLITUS: ICD-10-CM

## 2020-08-19 DIAGNOSIS — E03.9 ACQUIRED HYPOTHYROIDISM: Primary | ICD-10-CM

## 2020-08-19 DIAGNOSIS — Z13.0 SCREENING FOR IRON DEFICIENCY ANEMIA: ICD-10-CM

## 2020-08-19 PROBLEM — Z01.419 ENCOUNTER FOR ANNUAL ROUTINE GYNECOLOGICAL EXAMINATION: Status: RESOLVED | Noted: 2018-08-22 | Resolved: 2020-08-19

## 2020-08-19 PROCEDURE — 99214 OFFICE O/P EST MOD 30 MIN: CPT | Performed by: STUDENT IN AN ORGANIZED HEALTH CARE EDUCATION/TRAINING PROGRAM

## 2020-08-19 RX ORDER — LEVOTHYROXINE SODIUM 0.05 MG/1
50 TABLET ORAL
Qty: 90 TABLET | Refills: 1 | Status: SHIPPED | OUTPATIENT
Start: 2020-08-19 | End: 2021-03-02

## 2020-08-19 RX ORDER — ROSUVASTATIN CALCIUM 5 MG/1
5 TABLET, COATED ORAL NIGHTLY
Qty: 90 TABLET | Refills: 1 | Status: SHIPPED | OUTPATIENT
Start: 2020-08-19 | End: 2021-03-02

## 2020-08-19 NOTE — PROGRESS NOTES
Virtual Telephone Check-In    Edie Emilie verbally consents to a Virtual/Telephone Check-In visit on 08/19/20. Patient has been referred to the [facility] website at [email or web address] to review the yearly Consent to Treat document.     Patient Reuben Francis Negative for hearing loss, congestion, sore throat and neck pain. Eyes: Negative for pain and visual disturbance. Respiratory: Negative for cough and shortness of breath. Cardiovascular: Negative for chest pain and palpitations.    Gastrointestinal: to exercise at least three times per week. All side effects of cholesterol medication, including liver problems and muscle aches d/w the pt. Labs ordered, reminded to complete.    -     LIPID PANEL  -     Rosuvastatin Calcium 5 MG Oral Tab;  Take 1 table

## 2020-12-17 ENCOUNTER — PATIENT MESSAGE (OUTPATIENT)
Dept: INTERNAL MEDICINE CLINIC | Facility: CLINIC | Age: 55
End: 2020-12-17

## 2020-12-17 NOTE — TELEPHONE ENCOUNTER
From: Real Andrew  To: Wilfredo Kehr, MD  Sent: 12/17/2020 8:14 AM CST  Subject: Non-Urgent Medical Question    Can you make a note to my record that I got my annual flu shot on 11/24/20? Thanks.

## 2021-01-02 DIAGNOSIS — E78.5 DYSLIPIDEMIA: ICD-10-CM

## 2021-01-02 DIAGNOSIS — E03.9 ACQUIRED HYPOTHYROIDISM: ICD-10-CM

## 2021-01-04 RX ORDER — ROSUVASTATIN CALCIUM 5 MG/1
TABLET, COATED ORAL
Qty: 90 TABLET | Refills: 3 | OUTPATIENT
Start: 2021-01-04

## 2021-01-04 RX ORDER — LEVOTHYROXINE SODIUM 0.05 MG/1
TABLET ORAL
Qty: 90 TABLET | Refills: 3 | OUTPATIENT
Start: 2021-01-04

## 2021-01-18 DIAGNOSIS — E03.9 ACQUIRED HYPOTHYROIDISM: ICD-10-CM

## 2021-01-18 DIAGNOSIS — E78.5 DYSLIPIDEMIA: ICD-10-CM

## 2021-01-19 RX ORDER — ROSUVASTATIN CALCIUM 5 MG/1
TABLET, COATED ORAL
Qty: 90 TABLET | Refills: 0 | OUTPATIENT
Start: 2021-01-19

## 2021-01-19 RX ORDER — LEVOTHYROXINE SODIUM 0.05 MG/1
TABLET ORAL
Qty: 90 TABLET | Refills: 0 | OUTPATIENT
Start: 2021-01-19

## 2021-02-16 ENCOUNTER — PATIENT MESSAGE (OUTPATIENT)
Dept: INTERNAL MEDICINE CLINIC | Facility: CLINIC | Age: 56
End: 2021-02-16

## 2021-02-16 NOTE — TELEPHONE ENCOUNTER
From: Shalom Gil  To: Linda Louis MD  Sent: 2/16/2021 8:21 AM CST  Subject: Other    Hi Dr. Cortes Otero,   I am up again for prescription renewal and understand that I need to get my blood work done before that can happen.    - I am still concerned abou

## 2021-02-26 LAB
ABSOLUTE BASOPHILS: 38 CELLS/UL (ref 0–200)
ABSOLUTE EOSINOPHILS: 238 CELLS/UL (ref 15–500)
ABSOLUTE LYMPHOCYTES: 2921 CELLS/UL (ref 850–3900)
ABSOLUTE MONOCYTES: 502 CELLS/UL (ref 200–950)
ABSOLUTE NEUTROPHILS: 1701 CELLS/UL (ref 1500–7800)
ALBUMIN/GLOBULIN RATIO: 2 (CALC) (ref 1–2.5)
ALBUMIN: 4.9 G/DL (ref 3.6–5.1)
ALKALINE PHOSPHATASE: 56 U/L (ref 37–153)
ALT: 17 U/L (ref 6–29)
AST: 17 U/L (ref 10–35)
BASOPHILS: 0.7 %
BILIRUBIN, TOTAL: 1.1 MG/DL (ref 0.2–1.2)
BUN: 22 MG/DL (ref 7–25)
CALCIUM: 10.4 MG/DL (ref 8.6–10.4)
CARBON DIOXIDE: 31 MMOL/L (ref 20–32)
CHLORIDE: 104 MMOL/L (ref 98–110)
CHOL/HDLC RATIO: 2.5 (CALC)
CHOLESTEROL, TOTAL: 184 MG/DL
CREATININE: 0.85 MG/DL (ref 0.5–1.05)
EGFR IF AFRICN AM: 88 ML/MIN/1.73M2
EGFR IF NONAFRICN AM: 76 ML/MIN/1.73M2
EOSINOPHILS: 4.4 %
GLOBULIN: 2.5 G/DL (CALC) (ref 1.9–3.7)
GLUCOSE: 93 MG/DL (ref 65–99)
HDL CHOLESTEROL: 75 MG/DL
HEMATOCRIT: 43 % (ref 35–45)
HEMOGLOBIN A1C: 4.9 % OF TOTAL HGB
HEMOGLOBIN: 14.3 G/DL (ref 11.7–15.5)
LDL-CHOLESTEROL: 89 MG/DL (CALC)
LYMPHOCYTES: 54.1 %
MCH: 31 PG (ref 27–33)
MCHC: 33.3 G/DL (ref 32–36)
MCV: 93.1 FL (ref 80–100)
MONOCYTES: 9.3 %
MPV: 11.8 FL (ref 7.5–12.5)
NEUTROPHILS: 31.5 %
NON-HDL CHOLESTEROL: 109 MG/DL (CALC)
PLATELET COUNT: 235 THOUSAND/UL (ref 140–400)
POTASSIUM: 4.7 MMOL/L (ref 3.5–5.3)
PROTEIN, TOTAL: 7.4 G/DL (ref 6.1–8.1)
RDW: 12.3 % (ref 11–15)
RED BLOOD CELL COUNT: 4.62 MILLION/UL (ref 3.8–5.1)
SODIUM: 142 MMOL/L (ref 135–146)
TRIGLYCERIDES: 102 MG/DL
TSH W/REFLEX TO FT4: 3.29 MIU/L (ref 0.4–4.5)
WHITE BLOOD CELL COUNT: 5.4 THOUSAND/UL (ref 3.8–10.8)

## 2021-03-02 DIAGNOSIS — E78.5 DYSLIPIDEMIA: ICD-10-CM

## 2021-03-02 DIAGNOSIS — E03.9 ACQUIRED HYPOTHYROIDISM: ICD-10-CM

## 2021-03-03 RX ORDER — LEVOTHYROXINE SODIUM 0.05 MG/1
50 TABLET ORAL
Qty: 90 TABLET | Refills: 0 | Status: SHIPPED | OUTPATIENT
Start: 2021-03-03 | End: 2021-04-16

## 2021-03-03 RX ORDER — ROSUVASTATIN CALCIUM 5 MG/1
5 TABLET, COATED ORAL NIGHTLY
Qty: 90 TABLET | Refills: 0 | Status: SHIPPED | OUTPATIENT
Start: 2021-03-03 | End: 2021-04-16

## 2021-03-18 ENCOUNTER — IMMUNIZATION (OUTPATIENT)
Dept: LAB | Age: 56
End: 2021-03-18
Attending: HOSPITALIST
Payer: COMMERCIAL

## 2021-03-18 DIAGNOSIS — Z23 NEED FOR VACCINATION: Primary | ICD-10-CM

## 2021-03-18 PROCEDURE — 0001A SARSCOV2 VAC 30MCG/0.3ML IM: CPT

## 2021-04-08 ENCOUNTER — IMMUNIZATION (OUTPATIENT)
Dept: LAB | Age: 56
End: 2021-04-08
Attending: HOSPITALIST
Payer: COMMERCIAL

## 2021-04-08 DIAGNOSIS — Z23 NEED FOR VACCINATION: Primary | ICD-10-CM

## 2021-04-08 PROCEDURE — 0002A SARSCOV2 VAC 30MCG/0.3ML IM: CPT

## 2021-04-16 ENCOUNTER — OFFICE VISIT (OUTPATIENT)
Dept: INTERNAL MEDICINE CLINIC | Facility: CLINIC | Age: 56
End: 2021-04-16
Payer: COMMERCIAL

## 2021-04-16 VITALS
OXYGEN SATURATION: 98 % | DIASTOLIC BLOOD PRESSURE: 82 MMHG | TEMPERATURE: 97 F | HEART RATE: 83 BPM | RESPIRATION RATE: 16 BRPM | HEIGHT: 67.5 IN | BODY MASS INDEX: 19.64 KG/M2 | SYSTOLIC BLOOD PRESSURE: 122 MMHG | WEIGHT: 126.63 LBS

## 2021-04-16 DIAGNOSIS — E78.5 DYSLIPIDEMIA: ICD-10-CM

## 2021-04-16 DIAGNOSIS — E03.9 ACQUIRED HYPOTHYROIDISM: ICD-10-CM

## 2021-04-16 DIAGNOSIS — Z00.00 ENCOUNTER FOR ANNUAL PHYSICAL EXAM: Primary | ICD-10-CM

## 2021-04-16 DIAGNOSIS — Z12.31 ENCOUNTER FOR SCREENING MAMMOGRAM FOR BREAST CANCER: ICD-10-CM

## 2021-04-16 PROCEDURE — 99396 PREV VISIT EST AGE 40-64: CPT | Performed by: STUDENT IN AN ORGANIZED HEALTH CARE EDUCATION/TRAINING PROGRAM

## 2021-04-16 PROCEDURE — 3008F BODY MASS INDEX DOCD: CPT | Performed by: STUDENT IN AN ORGANIZED HEALTH CARE EDUCATION/TRAINING PROGRAM

## 2021-04-16 PROCEDURE — 3074F SYST BP LT 130 MM HG: CPT | Performed by: STUDENT IN AN ORGANIZED HEALTH CARE EDUCATION/TRAINING PROGRAM

## 2021-04-16 PROCEDURE — 3079F DIAST BP 80-89 MM HG: CPT | Performed by: STUDENT IN AN ORGANIZED HEALTH CARE EDUCATION/TRAINING PROGRAM

## 2021-04-16 RX ORDER — ROSUVASTATIN CALCIUM 5 MG/1
5 TABLET, COATED ORAL NIGHTLY
Qty: 90 TABLET | Refills: 3 | Status: SHIPPED | OUTPATIENT
Start: 2021-04-16

## 2021-04-16 RX ORDER — MULTIVIT-MIN/IRON/FOLIC ACID/K 18-600-40
1 CAPSULE ORAL DAILY
COMMUNITY

## 2021-04-16 RX ORDER — LEVOTHYROXINE SODIUM 0.05 MG/1
50 TABLET ORAL
Qty: 90 TABLET | Refills: 3 | Status: SHIPPED | OUTPATIENT
Start: 2021-04-16

## 2021-04-16 RX ORDER — LORAZEPAM 0.5 MG
1 TABLET ORAL DAILY
COMMUNITY

## 2021-04-16 NOTE — PROGRESS NOTES
[facility]    REASON FOR VISIT:    Edie Phan is a 60year old female who presents for an [address]. Current Complaints: feeling well. Reports compliance with the medications, denies any side effects  Vaccinations:  Tdap 2015 Value   02/25/2021 89       Diabetes Screening  if history of high blood pressure or other  risk factors HEMOGLOBIN A1c (% of total Hgb)   Date Value   02/25/2021 4.9     GLUCOSE (mg/dL)   Date Value   02/25/2021 93         Gonorrhea Screening if high risk (gout) Father         like vodka   • Diabetes Mother    • Lipids Mother    • Dementia Mother    • Other (breast cancer) Maternal Aunt 50   • Other (gout) Brother       SOCIAL HISTORY:   Social History    Tobacco Use      Smoking status: Former Smoker She appears her stated age, nourished, and pleasant. Vital signs reviewed as noted  Head: Normocephalic and atraumatic. Nose: Nose normal.   Eyes: EOM are normal. Pupils are equal, round, and reactive to light. No scleral icterus.    Throat: no oropharyng physical exam  Body mass index is Body mass index is 19.54 kg/m². Recommended balanced fat diet and aerobic exercise 30 minutes three times weekly. Health maintenance: labs reviewed and all wnl. Vaccinations: Tdap is utd. FLU vaccination in the fall.   Anabel Jimenez perfomed    SUGGESTED VACCINATIONS - Influenza, Pneumococcal, Zoster, Tetanus     Immunization History   Administered Date(s) Administered   • Covid-19 Vaccine Pfizer 30 mcg/0.3 ml 03/18/2021, 04/08/2021   • Influenza 10/05/2016, 10/17/2017, 09/04/2018, 09

## 2021-04-28 ENCOUNTER — TELEPHONE (OUTPATIENT)
Dept: INTERNAL MEDICINE CLINIC | Facility: CLINIC | Age: 56
End: 2021-04-28

## 2021-04-28 DIAGNOSIS — Z23 NEED FOR VACCINATION: Primary | ICD-10-CM

## 2021-04-28 NOTE — TELEPHONE ENCOUNTER
Patient scheduled an appointment for April 30th through [facility] for the first dose of the shingles vaccine. She said it is covered by her insurance. Please place the order. Thank you!

## 2021-04-30 ENCOUNTER — NURSE ONLY (OUTPATIENT)
Dept: INTERNAL MEDICINE CLINIC | Facility: CLINIC | Age: 56
End: 2021-04-30
Payer: COMMERCIAL

## 2021-04-30 PROCEDURE — 90750 HZV VACC RECOMBINANT IM: CPT | Performed by: STUDENT IN AN ORGANIZED HEALTH CARE EDUCATION/TRAINING PROGRAM

## 2021-04-30 PROCEDURE — 90471 IMMUNIZATION ADMIN: CPT | Performed by: STUDENT IN AN ORGANIZED HEALTH CARE EDUCATION/TRAINING PROGRAM

## 2021-05-08 ENCOUNTER — HOSPITAL ENCOUNTER (OUTPATIENT)
Dept: MAMMOGRAPHY | Age: 56
Discharge: HOME | End: 2021-05-08
Attending: STUDENT IN AN ORGANIZED HEALTH CARE EDUCATION/TRAINING PROGRAM
Payer: COMMERCIAL

## 2021-05-08 DIAGNOSIS — Z12.31 ENCOUNTER FOR SCREENING MAMMOGRAM FOR BREAST CANCER: ICD-10-CM

## 2021-05-08 PROCEDURE — 77063 BREAST TOMOSYNTHESIS BI: CPT | Performed by: STUDENT IN AN ORGANIZED HEALTH CARE EDUCATION/TRAINING PROGRAM

## 2021-05-08 PROCEDURE — 77067 SCR MAMMO BI INCL CAD: CPT | Performed by: STUDENT IN AN ORGANIZED HEALTH CARE EDUCATION/TRAINING PROGRAM

## 2021-07-06 ENCOUNTER — TELEPHONE (OUTPATIENT)
Dept: INTERNAL MEDICINE CLINIC | Facility: CLINIC | Age: 56
End: 2021-07-06

## 2021-07-06 DIAGNOSIS — Z23 NEED FOR VACCINATION: ICD-10-CM

## 2021-07-06 NOTE — TELEPHONE ENCOUNTER
Pt called to schedule 2nd shingrix vaccine, did not see order in system, pt appt 7/9/21    Please advise    Thank you

## 2021-07-09 ENCOUNTER — NURSE ONLY (OUTPATIENT)
Dept: INTERNAL MEDICINE CLINIC | Facility: CLINIC | Age: 56
End: 2021-07-09
Payer: COMMERCIAL

## 2021-07-09 PROCEDURE — 90750 HZV VACC RECOMBINANT IM: CPT | Performed by: STUDENT IN AN ORGANIZED HEALTH CARE EDUCATION/TRAINING PROGRAM

## 2021-07-09 PROCEDURE — 90471 IMMUNIZATION ADMIN: CPT | Performed by: STUDENT IN AN ORGANIZED HEALTH CARE EDUCATION/TRAINING PROGRAM

## 2021-10-11 ENCOUNTER — OFFICE VISIT (OUTPATIENT)
Dept: INTERNAL MEDICINE CLINIC | Facility: CLINIC | Age: 56
End: 2021-10-11
Payer: COMMERCIAL

## 2021-10-11 VITALS
HEART RATE: 50 BPM | TEMPERATURE: 98 F | SYSTOLIC BLOOD PRESSURE: 118 MMHG | DIASTOLIC BLOOD PRESSURE: 68 MMHG | BODY MASS INDEX: 20.23 KG/M2 | RESPIRATION RATE: 14 BRPM | HEIGHT: 67.5 IN | WEIGHT: 130.38 LBS | OXYGEN SATURATION: 95 %

## 2021-10-11 DIAGNOSIS — M25.531 RIGHT WRIST PAIN: ICD-10-CM

## 2021-10-11 DIAGNOSIS — Z23 NEED FOR VACCINATION: ICD-10-CM

## 2021-10-11 DIAGNOSIS — M25.531 RIGHT WRIST PAIN: Primary | ICD-10-CM

## 2021-10-11 PROCEDURE — 3078F DIAST BP <80 MM HG: CPT | Performed by: NURSE PRACTITIONER

## 2021-10-11 PROCEDURE — 90471 IMMUNIZATION ADMIN: CPT | Performed by: NURSE PRACTITIONER

## 2021-10-11 PROCEDURE — 3074F SYST BP LT 130 MM HG: CPT | Performed by: NURSE PRACTITIONER

## 2021-10-11 PROCEDURE — 90686 IIV4 VACC NO PRSV 0.5 ML IM: CPT | Performed by: NURSE PRACTITIONER

## 2021-10-11 PROCEDURE — 3008F BODY MASS INDEX DOCD: CPT | Performed by: NURSE PRACTITIONER

## 2021-10-11 PROCEDURE — 99214 OFFICE O/P EST MOD 30 MIN: CPT | Performed by: NURSE PRACTITIONER

## 2021-10-11 RX ORDER — LORATADINE 10 MG/1
10 TABLET ORAL DAILY
COMMUNITY

## 2021-10-11 RX ORDER — MELOXICAM 15 MG/1
15 TABLET ORAL DAILY
Qty: 30 TABLET | Refills: 0 | Status: SHIPPED | OUTPATIENT
Start: 2021-10-11 | End: 2021-11-10

## 2021-10-11 NOTE — PATIENT INSTRUCTIONS
Get your wrist xray done    See ortho    Continue the wrist brace    Start meloxicam. Monitor for side effects and for allergy as well as effectiveness. No other NSAIDs with this medication. You may take tylenol as needed.

## 2021-10-11 NOTE — PROGRESS NOTES
Constance Tello is a 60year old female. CHIEF COMPLAINT   Right wrist pain    HPI:   0 at rest, moderate to severe with movement. Has a difficult time putting a number on it but is sharp and feels like a knife is stabbing it.   Once she rests and stops mo REVIEW OF SYSTEMS:   See HPI    EXAM:     /68 (BP Location: Left arm, Patient Position: Sitting, Cuff Size: adult)   Pulse 50   Temp 98.1 °F (36.7 °C) (Temporal)   Resp 14   Ht 5' 7.5\" (1.715 m)   Wt 130 lb 6.4 oz (59.1 kg)   LMP 01/31/2017   Sp Results   Component Value Date    T4F 1.0 07/17/2017    TSH 5.000 07/23/2018    TSHT4 3.29 02/25/2021      Lab Results   Component Value Date    A1C 4.9 02/25/2021        ASSESSMENT AND PLAN:   1.  Right wrist pain  -The patient is with right wrist pain judi

## 2021-10-12 RX ORDER — MELOXICAM 15 MG/1
TABLET ORAL
Qty: 90 TABLET | Refills: 0 | OUTPATIENT
Start: 2021-10-12

## 2021-10-13 ENCOUNTER — MED REC SCAN ONLY (OUTPATIENT)
Dept: INTERNAL MEDICINE CLINIC | Facility: CLINIC | Age: 56
End: 2021-10-13

## 2021-10-14 ENCOUNTER — TELEPHONE (OUTPATIENT)
Dept: INTERNAL MEDICINE CLINIC | Facility: CLINIC | Age: 56
End: 2021-10-14

## 2021-10-14 NOTE — TELEPHONE ENCOUNTER
Incoming (mail or fax): Fax   Received from:  [facility]   Documentation given to:  Kristen test result bin

## 2021-10-14 NOTE — TELEPHONE ENCOUNTER
Received results for the following tests: RIGHT WRIST XRAY completed on 10/13/21 at [address] as able. To Kristen for review. Noted pt has not scheduled with Ortho yet.

## 2022-03-14 ENCOUNTER — PATIENT MESSAGE (OUTPATIENT)
Dept: INTERNAL MEDICINE CLINIC | Facility: CLINIC | Age: 57
End: 2022-03-14

## 2022-03-14 NOTE — TELEPHONE ENCOUNTER
From: Raffy Police  To: BENSON Acuña  Sent: 3/14/2022 2:19 PM CDT  Subject: Need med refill release    I have scheduled my April physical. Can someone approve my prescription refills with [facility]? They were denied.     Thank you,  Gordon Najjar

## 2022-03-15 RX ORDER — LEVOTHYROXINE SODIUM 0.05 MG/1
50 TABLET ORAL
Qty: 90 TABLET | Refills: 0 | Status: SHIPPED | OUTPATIENT
Start: 2022-03-15

## 2022-03-15 RX ORDER — ROSUVASTATIN CALCIUM 5 MG/1
5 TABLET, COATED ORAL NIGHTLY
Qty: 90 TABLET | Refills: 0 | Status: SHIPPED | OUTPATIENT
Start: 2022-03-15

## 2022-03-15 NOTE — TELEPHONE ENCOUNTER
Last OV pertinent to medication 4/16/21  Last refill date: 4/16/21 #/refills: 90 w/3  When patient was asked to return for OV: 1 year  Upcomming appt/reason: 4/18/22: Physical  Labs:   NA

## 2022-04-18 ENCOUNTER — OFFICE VISIT (OUTPATIENT)
Dept: INTERNAL MEDICINE CLINIC | Facility: CLINIC | Age: 57
End: 2022-04-18
Payer: COMMERCIAL

## 2022-04-18 VITALS
TEMPERATURE: 99 F | WEIGHT: 129.88 LBS | SYSTOLIC BLOOD PRESSURE: 116 MMHG | OXYGEN SATURATION: 97 % | DIASTOLIC BLOOD PRESSURE: 74 MMHG | HEIGHT: 68 IN | RESPIRATION RATE: 14 BRPM | HEART RATE: 59 BPM | BODY MASS INDEX: 19.68 KG/M2

## 2022-04-18 DIAGNOSIS — E03.9 ACQUIRED HYPOTHYROIDISM: ICD-10-CM

## 2022-04-18 DIAGNOSIS — E78.5 DYSLIPIDEMIA: ICD-10-CM

## 2022-04-18 DIAGNOSIS — Z00.00 ENCOUNTER FOR ANNUAL PHYSICAL EXAM: Primary | ICD-10-CM

## 2022-04-18 DIAGNOSIS — E55.9 VITAMIN D DEFICIENCY: ICD-10-CM

## 2022-04-18 PROCEDURE — 3078F DIAST BP <80 MM HG: CPT | Performed by: NURSE PRACTITIONER

## 2022-04-18 PROCEDURE — 99214 OFFICE O/P EST MOD 30 MIN: CPT | Performed by: NURSE PRACTITIONER

## 2022-04-18 PROCEDURE — 99396 PREV VISIT EST AGE 40-64: CPT | Performed by: NURSE PRACTITIONER

## 2022-04-18 PROCEDURE — 3074F SYST BP LT 130 MM HG: CPT | Performed by: NURSE PRACTITIONER

## 2022-04-18 PROCEDURE — 3008F BODY MASS INDEX DOCD: CPT | Performed by: NURSE PRACTITIONER

## 2022-04-18 RX ORDER — ASCORBIC ACID 500 MG
1 TABLET ORAL DAILY
COMMUNITY

## 2022-04-18 NOTE — PATIENT INSTRUCTIONS
Get your labs done. You should be fasting for at least 10 hours. If you take a multivitamin with Biotin or any biotin product it should be held for 3 days prior to getting your labs done.     Continue your current medications    See gyne when due    Follow up in 1 year or sooner as needed

## 2022-04-22 LAB
ABSOLUTE BASOPHILS: 42 CELLS/UL (ref 0–200)
ABSOLUTE EOSINOPHILS: 172 CELLS/UL (ref 15–500)
ABSOLUTE LYMPHOCYTES: 2740 CELLS/UL (ref 850–3900)
ABSOLUTE MONOCYTES: 463 CELLS/UL (ref 200–950)
ABSOLUTE NEUTROPHILS: 1784 CELLS/UL (ref 1500–7800)
ALBUMIN/GLOBULIN RATIO: 2 (CALC) (ref 1–2.5)
ALBUMIN: 4.9 G/DL (ref 3.6–5.1)
ALKALINE PHOSPHATASE: 56 U/L (ref 37–153)
ALT: 15 U/L (ref 6–29)
AST: 17 U/L (ref 10–35)
BASOPHILS: 0.8 %
BILIRUBIN, TOTAL: 1 MG/DL (ref 0.2–1.2)
BUN: 19 MG/DL (ref 7–25)
CALCIUM: 10.3 MG/DL (ref 8.6–10.4)
CARBON DIOXIDE: 32 MMOL/L (ref 20–32)
CHLORIDE: 105 MMOL/L (ref 98–110)
CHOL/HDLC RATIO: 2.2 (CALC)
CHOLESTEROL, TOTAL: 201 MG/DL
CREATININE: 0.8 MG/DL (ref 0.5–1.05)
EGFR IF AFRICN AM: 94 ML/MIN/1.73M2
EGFR IF NONAFRICN AM: 81 ML/MIN/1.73M2
EOSINOPHILS: 3.3 %
GLOBULIN: 2.4 G/DL (CALC) (ref 1.9–3.7)
GLUCOSE: 90 MG/DL (ref 65–99)
HDL CHOLESTEROL: 92 MG/DL
HEMATOCRIT: 45.2 % (ref 35–45)
HEMOGLOBIN: 15.3 G/DL (ref 11.7–15.5)
LDL-CHOLESTEROL: 89 MG/DL (CALC)
LYMPHOCYTES: 52.7 %
MCH: 31.9 PG (ref 27–33)
MCHC: 33.8 G/DL (ref 32–36)
MCV: 94.4 FL (ref 80–100)
MONOCYTES: 8.9 %
MPV: 11 FL (ref 7.5–12.5)
NEUTROPHILS: 34.3 %
NON-HDL CHOLESTEROL: 109 MG/DL (CALC)
PLATELET COUNT: 227 THOUSAND/UL (ref 140–400)
POTASSIUM: 4.6 MMOL/L (ref 3.5–5.3)
PROTEIN, TOTAL: 7.3 G/DL (ref 6.1–8.1)
RDW: 12.4 % (ref 11–15)
RED BLOOD CELL COUNT: 4.79 MILLION/UL (ref 3.8–5.1)
SODIUM: 143 MMOL/L (ref 135–146)
TRIGLYCERIDES: 104 MG/DL
TSH: 6.1 MIU/L (ref 0.4–4.5)
VITAMIN D, 25-OH, TOTAL: 26 NG/ML (ref 30–100)
WHITE BLOOD CELL COUNT: 5.2 THOUSAND/UL (ref 3.8–10.8)

## 2022-04-27 ENCOUNTER — PATIENT MESSAGE (OUTPATIENT)
Dept: INTERNAL MEDICINE CLINIC | Facility: CLINIC | Age: 57
End: 2022-04-27

## 2022-04-27 NOTE — TELEPHONE ENCOUNTER
From: Krystina Baird  To: BENSON Price  Sent: 4/27/2022 11:55 AM CDT  Subject: Question about thyroid results     I did speak with the nurse yesterday about upping my prescription from 50 to 75 - but I have a question. For my past fasting blood tests, I did take my synthroid early in the morning before going for the bloodwork. This time I did not take my pill. Would that have skewed the results?     Thanks,  Momo Serna

## 2022-05-02 ENCOUNTER — PATIENT MESSAGE (OUTPATIENT)
Dept: INTERNAL MEDICINE CLINIC | Facility: CLINIC | Age: 57
End: 2022-05-02

## 2022-05-14 ENCOUNTER — HOSPITAL ENCOUNTER (OUTPATIENT)
Dept: MAMMOGRAPHY | Age: 57
Discharge: HOME | End: 2022-05-14
Attending: INTERNAL MEDICINE
Payer: COMMERCIAL

## 2022-05-14 DIAGNOSIS — Z12.31 ENCOUNTER FOR MAMMOGRAM TO ESTABLISH BASELINE MAMMOGRAM: ICD-10-CM

## 2022-05-14 DIAGNOSIS — Z12.31 ENCOUNTER FOR SCREENING MAMMOGRAM FOR MALIGNANT NEOPLASM OF BREAST: ICD-10-CM

## 2022-05-14 PROCEDURE — 77067 SCR MAMMO BI INCL CAD: CPT | Performed by: NURSE PRACTITIONER

## 2022-05-14 PROCEDURE — 77063 BREAST TOMOSYNTHESIS BI: CPT | Performed by: NURSE PRACTITIONER

## 2022-06-02 DIAGNOSIS — E78.5 DYSLIPIDEMIA: ICD-10-CM

## 2022-06-03 RX ORDER — ROSUVASTATIN CALCIUM 5 MG/1
TABLET, COATED ORAL
Qty: 90 TABLET | Refills: 2 | Status: SHIPPED | OUTPATIENT
Start: 2022-06-03

## 2022-06-16 DIAGNOSIS — E03.9 HYPOTHYROIDISM, UNSPECIFIED TYPE: ICD-10-CM

## 2022-06-17 RX ORDER — LEVOTHYROXINE SODIUM 0.07 MG/1
TABLET ORAL
Qty: 90 TABLET | Refills: 0 | Status: SHIPPED | OUTPATIENT
Start: 2022-06-17

## 2022-06-17 NOTE — TELEPHONE ENCOUNTER
Last OV relevant to medication: PE 4/18/22  Last refill date: 4/26/22 90     #/refills:   When pt was asked to return for OV: 1 year   Upcoming appt/reason:   Future Appointments   Date Time Provider Purvi Solorio   11/16/2022  7:45 AM Yanna Reardon MD [facility]       Was pt informed of any over due labs: due   Free T4 (ng/dL)   Date Value   07/17/2017 1.0     TSH (mIU/L)   Date Value   04/21/2022 6.10 (H)     TSH W/REFLEX TO FT4 (mIU/L)   Date Value   02/25/2021 3.29

## 2022-08-09 DIAGNOSIS — E03.9 HYPOTHYROIDISM, UNSPECIFIED TYPE: Primary | ICD-10-CM

## 2022-09-06 DIAGNOSIS — E03.9 HYPOTHYROIDISM, UNSPECIFIED TYPE: ICD-10-CM

## 2022-09-07 RX ORDER — LEVOTHYROXINE SODIUM 0.07 MG/1
TABLET ORAL
Qty: 90 TABLET | Refills: 0 | Status: SHIPPED | OUTPATIENT
Start: 2022-09-07

## 2022-11-10 ENCOUNTER — PATIENT MESSAGE (OUTPATIENT)
Dept: INTERNAL MEDICINE CLINIC | Facility: CLINIC | Age: 57
End: 2022-11-10

## 2022-11-10 NOTE — TELEPHONE ENCOUNTER
From: Meli Pabon  To: BENSON Rodírguez  Sent: 11/10/2022 9:48 AM CST  Subject: Follow-up thyroid test    I have a note to get another blood test after November 9th for my thyroid levels. Has that order been sent to [facility] so I can schedule an appointment there?

## 2022-11-13 LAB — TSH: 0.58 MIU/L (ref 0.4–4.5)

## 2022-11-15 DIAGNOSIS — E03.9 HYPOTHYROIDISM, UNSPECIFIED TYPE: Primary | ICD-10-CM

## 2022-11-15 RX ORDER — LEVOTHYROXINE SODIUM 0.05 MG/1
50 TABLET ORAL
Refills: 0 | COMMUNITY
Start: 2022-11-15

## 2022-12-01 DIAGNOSIS — E03.9 HYPOTHYROIDISM, UNSPECIFIED TYPE: ICD-10-CM

## 2022-12-01 RX ORDER — LEVOTHYROXINE SODIUM 0.07 MG/1
TABLET ORAL
Qty: 90 TABLET | Refills: 0 | Status: SHIPPED | OUTPATIENT
Start: 2022-12-01

## 2023-02-10 DIAGNOSIS — E03.9 HYPOTHYROIDISM, UNSPECIFIED TYPE: ICD-10-CM

## 2023-02-10 DIAGNOSIS — E78.5 DYSLIPIDEMIA: ICD-10-CM

## 2023-02-13 RX ORDER — LEVOTHYROXINE SODIUM 0.07 MG/1
TABLET ORAL
Qty: 90 TABLET | Refills: 1 | Status: SHIPPED | OUTPATIENT
Start: 2023-02-13

## 2023-02-13 RX ORDER — ROSUVASTATIN CALCIUM 5 MG/1
TABLET, COATED ORAL
Qty: 90 TABLET | Refills: 1 | Status: SHIPPED | OUTPATIENT
Start: 2023-02-13

## 2023-04-19 ENCOUNTER — OFFICE VISIT (OUTPATIENT)
Dept: INTERNAL MEDICINE CLINIC | Facility: CLINIC | Age: 58
End: 2023-04-19
Payer: COMMERCIAL

## 2023-04-19 VITALS
SYSTOLIC BLOOD PRESSURE: 98 MMHG | BODY MASS INDEX: 18.48 KG/M2 | OXYGEN SATURATION: 98 % | TEMPERATURE: 98 F | WEIGHT: 123.38 LBS | HEIGHT: 68.5 IN | HEART RATE: 59 BPM | RESPIRATION RATE: 14 BRPM | DIASTOLIC BLOOD PRESSURE: 70 MMHG

## 2023-04-19 DIAGNOSIS — E78.5 DYSLIPIDEMIA: ICD-10-CM

## 2023-04-19 DIAGNOSIS — Z00.00 ENCOUNTER FOR ANNUAL PHYSICAL EXAM: Primary | ICD-10-CM

## 2023-04-19 DIAGNOSIS — E53.8 B12 DEFICIENCY: ICD-10-CM

## 2023-04-19 DIAGNOSIS — E03.9 ACQUIRED HYPOTHYROIDISM: ICD-10-CM

## 2023-04-19 DIAGNOSIS — E55.9 VITAMIN D DEFICIENCY: ICD-10-CM

## 2023-04-19 DIAGNOSIS — Z12.11 SCREENING FOR COLON CANCER: ICD-10-CM

## 2023-04-19 PROCEDURE — 99396 PREV VISIT EST AGE 40-64: CPT | Performed by: NURSE PRACTITIONER

## 2023-04-19 PROCEDURE — 3074F SYST BP LT 130 MM HG: CPT | Performed by: NURSE PRACTITIONER

## 2023-04-19 PROCEDURE — 3078F DIAST BP <80 MM HG: CPT | Performed by: NURSE PRACTITIONER

## 2023-04-19 PROCEDURE — 3008F BODY MASS INDEX DOCD: CPT | Performed by: NURSE PRACTITIONER

## 2023-04-19 PROCEDURE — 99214 OFFICE O/P EST MOD 30 MIN: CPT | Performed by: NURSE PRACTITIONER

## 2023-04-19 RX ORDER — PERPHENAZINE/AMITRIPTYLINE HCL 2 MG-10 MG
1 TABLET ORAL DAILY
COMMUNITY
Start: 2023-01-01

## 2023-04-19 RX ORDER — SERINE 100 %
1 CRYSTALS MISCELLANEOUS DAILY
COMMUNITY
Start: 2023-03-01

## 2023-04-19 RX ORDER — DIINDOLYLMETHANE 100 %
100 POWDER (GRAM) MISCELLANEOUS DAILY
COMMUNITY
Start: 2023-03-01

## 2023-04-19 NOTE — PATIENT INSTRUCTIONS
Get your labs done. You should be fasting for at least 10 hours. If you take a multivitamin with Biotin or any biotin product it should be held for 3 days prior to getting your labs done.      Get your mammogram done when due    Make an appointment to get your colonoscopy done     Continue your current medications    Continue to see gyne when due    Follow up in 1 year or sooner as needed

## 2023-04-23 LAB
ABSOLUTE BASOPHILS: 29 CELLS/UL (ref 0–200)
ABSOLUTE EOSINOPHILS: 167 CELLS/UL (ref 15–500)
ABSOLUTE LYMPHOCYTES: 2479 CELLS/UL (ref 850–3900)
ABSOLUTE MONOCYTES: 515 CELLS/UL (ref 200–950)
ABSOLUTE NEUTROPHILS: 1710 CELLS/UL (ref 1500–7800)
ALBUMIN/GLOBULIN RATIO: 2.1 (CALC) (ref 1–2.5)
ALBUMIN: 4.8 G/DL (ref 3.6–5.1)
ALKALINE PHOSPHATASE: 58 U/L (ref 37–153)
ALT: 19 U/L (ref 6–29)
AST: 19 U/L (ref 10–35)
BASOPHILS: 0.6 %
BILIRUBIN, TOTAL: 0.7 MG/DL (ref 0.2–1.2)
BUN: 21 MG/DL (ref 7–25)
CALCIUM: 10.3 MG/DL (ref 8.6–10.4)
CARBON DIOXIDE: 32 MMOL/L (ref 20–32)
CHLORIDE: 102 MMOL/L (ref 98–110)
CHOL/HDLC RATIO: 2.2 (CALC)
CHOLESTEROL, TOTAL: 182 MG/DL
CREATININE: 0.81 MG/DL (ref 0.5–1.03)
EGFR: 84 ML/MIN/1.73M2
EOSINOPHILS: 3.4 %
GLOBULIN: 2.3 G/DL (CALC) (ref 1.9–3.7)
GLUCOSE: 96 MG/DL (ref 65–99)
HDL CHOLESTEROL: 84 MG/DL
HEMATOCRIT: 44.1 % (ref 35–45)
HEMOGLOBIN: 14.5 G/DL (ref 11.7–15.5)
LDL-CHOLESTEROL: 79 MG/DL (CALC)
LYMPHOCYTES: 50.6 %
MCH: 31.7 PG (ref 27–33)
MCHC: 32.9 G/DL (ref 32–36)
MCV: 96.3 FL (ref 80–100)
MONOCYTES: 10.5 %
MPV: 11.8 FL (ref 7.5–12.5)
NEUTROPHILS: 34.9 %
NON-HDL CHOLESTEROL: 98 MG/DL (CALC)
PLATELET COUNT: 226 THOUSAND/UL (ref 140–400)
POTASSIUM: 4.5 MMOL/L (ref 3.5–5.3)
PROTEIN, TOTAL: 7.1 G/DL (ref 6.1–8.1)
RDW: 12.4 % (ref 11–15)
RED BLOOD CELL COUNT: 4.58 MILLION/UL (ref 3.8–5.1)
SODIUM: 139 MMOL/L (ref 135–146)
TRIGLYCERIDES: 109 MG/DL
TSH: 1.88 MIU/L (ref 0.4–4.5)
VITAMIN B12: 552 PG/ML (ref 200–1100)
VITAMIN D, 25-OH, TOTAL: 39 NG/ML (ref 30–100)
WHITE BLOOD CELL COUNT: 4.9 THOUSAND/UL (ref 3.8–10.8)

## 2023-05-02 ENCOUNTER — ORDER TRANSCRIPTION (OUTPATIENT)
Dept: ADMINISTRATIVE | Facility: HOSPITAL | Age: 58
End: 2023-05-02

## 2023-05-02 DIAGNOSIS — Z12.31 ENCOUNTER FOR SCREENING MAMMOGRAM FOR MALIGNANT NEOPLASM OF BREAST: Primary | ICD-10-CM

## 2023-05-18 ENCOUNTER — OFFICE VISIT (OUTPATIENT)
Dept: OBGYN CLINIC | Facility: CLINIC | Age: 58
End: 2023-05-18

## 2023-05-18 VITALS
HEIGHT: 68 IN | DIASTOLIC BLOOD PRESSURE: 72 MMHG | BODY MASS INDEX: 19.13 KG/M2 | SYSTOLIC BLOOD PRESSURE: 114 MMHG | WEIGHT: 126.19 LBS

## 2023-05-18 DIAGNOSIS — N95.1 INSOMNIA ASSOCIATED WITH MENOPAUSE: ICD-10-CM

## 2023-05-18 DIAGNOSIS — N95.2 POSTMENOPAUSAL ATROPHIC VAGINITIS: ICD-10-CM

## 2023-05-18 DIAGNOSIS — Z12.31 ENCOUNTER FOR SCREENING MAMMOGRAM FOR BREAST CANCER: ICD-10-CM

## 2023-05-18 DIAGNOSIS — Z01.419 ENCOUNTER FOR WELL WOMAN EXAM WITH ROUTINE GYNECOLOGICAL EXAM: Primary | ICD-10-CM

## 2023-05-18 PROCEDURE — 3074F SYST BP LT 130 MM HG: CPT | Performed by: OBSTETRICS & GYNECOLOGY

## 2023-05-18 PROCEDURE — 3008F BODY MASS INDEX DOCD: CPT | Performed by: OBSTETRICS & GYNECOLOGY

## 2023-05-18 PROCEDURE — 3078F DIAST BP <80 MM HG: CPT | Performed by: OBSTETRICS & GYNECOLOGY

## 2023-05-18 PROCEDURE — 99386 PREV VISIT NEW AGE 40-64: CPT | Performed by: OBSTETRICS & GYNECOLOGY

## 2023-05-18 RX ORDER — ESTRADIOL 0.1 MG/D
1 FILM, EXTENDED RELEASE TRANSDERMAL
Qty: 24 PATCH | Refills: 3 | Status: CANCELLED | OUTPATIENT
Start: 2023-05-18

## 2023-05-18 RX ORDER — ESTRADIOL 0.05 MG/D
1 PATCH, EXTENDED RELEASE TRANSDERMAL
Qty: 24 PATCH | Refills: 4 | Status: SHIPPED | OUTPATIENT
Start: 2023-05-18

## 2023-05-18 RX ORDER — ESTRADIOL 0.1 MG/G
1 CREAM VAGINAL DAILY
Qty: 42.5 G | Refills: 3 | Status: SHIPPED | OUTPATIENT
Start: 2023-05-18

## 2023-05-18 RX ORDER — PROGESTERONE 200 MG/1
200 CAPSULE ORAL AS DIRECTED
Qty: 40 CAPSULE | Refills: 1 | Status: SHIPPED | OUTPATIENT
Start: 2023-05-18

## 2023-05-20 ENCOUNTER — HOSPITAL ENCOUNTER (OUTPATIENT)
Dept: MAMMOGRAPHY | Age: 58
Discharge: HOME | End: 2023-05-20
Attending: INTERNAL MEDICINE
Payer: COMMERCIAL

## 2023-05-20 DIAGNOSIS — Z12.31 ENCOUNTER FOR SCREENING MAMMOGRAM FOR BREAST CANCER: ICD-10-CM

## 2023-05-20 DIAGNOSIS — Z12.31 ENCOUNTER FOR SCREENING MAMMOGRAM FOR MALIGNANT NEOPLASM OF BREAST: ICD-10-CM

## 2023-05-20 PROCEDURE — 77063 BREAST TOMOSYNTHESIS BI: CPT | Performed by: OBSTETRICS & GYNECOLOGY

## 2023-05-20 PROCEDURE — 77067 SCR MAMMO BI INCL CAD: CPT | Performed by: OBSTETRICS & GYNECOLOGY

## 2023-05-25 LAB — HPV I/H RISK 1 DNA SPEC QL NAA+PROBE: NEGATIVE

## 2023-06-12 ENCOUNTER — PATIENT MESSAGE (OUTPATIENT)
Dept: OBGYN CLINIC | Facility: CLINIC | Age: 58
End: 2023-06-12

## 2023-06-12 RX ORDER — ESTRADIOL 0.05 MG/D
1 PATCH, EXTENDED RELEASE TRANSDERMAL
Qty: 24 PATCH | Refills: 4 | Status: SHIPPED | OUTPATIENT
Start: 2023-06-12

## 2023-06-12 RX ORDER — PROGESTERONE 200 MG/1
200 CAPSULE ORAL DAILY
Qty: 10 CAPSULE | Refills: 3 | Status: SHIPPED | OUTPATIENT
Start: 2023-06-12

## 2023-06-12 RX ORDER — ESTRADIOL 0.1 MG/G
1 CREAM VAGINAL DAILY
Qty: 42.5 G | Refills: 3 | Status: SHIPPED | OUTPATIENT
Start: 2023-06-12

## 2023-06-12 NOTE — TELEPHONE ENCOUNTER
From: Franchesca Capone  To: Dave Brownlee MD  Sent: 6/12/2023 1:26 PM CDT  Subject: Prescriptions Cancelled     Hi Dr. Nereida Church- I just got back from a 2 week vacation to find the [facility] cancelled the three new prescriptions you wrote for me for Estradiol, Progesterone, and the Lyllana Patch. When I spoke with [facility] the said they had questions about the directions and reached out to your office twice with no response. Can we get this resolved? Thank you!

## 2023-06-13 ENCOUNTER — TELEPHONE (OUTPATIENT)
Dept: OBGYN CLINIC | Facility: CLINIC | Age: 58
End: 2023-06-13

## 2023-06-13 NOTE — TELEPHONE ENCOUNTER
Caesar from [employer] about [place] [employer] has a questions on directions, typically not used daily for long term

## 2023-06-14 NOTE — TELEPHONE ENCOUNTER
Spoke with pharmacist and clarified the directions for the medication. I also called the patient to inform her that everything has updated and the pharmacy will ship out her medication. No further concerns at this time.

## 2023-07-25 DIAGNOSIS — E03.9 HYPOTHYROIDISM, UNSPECIFIED TYPE: ICD-10-CM

## 2023-07-25 DIAGNOSIS — E78.5 DYSLIPIDEMIA: ICD-10-CM

## 2023-07-27 ENCOUNTER — TELEPHONE (OUTPATIENT)
Dept: INTERNAL MEDICINE CLINIC | Facility: CLINIC | Age: 58
End: 2023-07-27

## 2023-07-27 RX ORDER — ROSUVASTATIN CALCIUM 5 MG/1
TABLET, COATED ORAL
Qty: 90 TABLET | Refills: 2 | Status: SHIPPED | OUTPATIENT
Start: 2023-07-27

## 2023-07-27 RX ORDER — LEVOTHYROXINE SODIUM 0.07 MG/1
TABLET ORAL
Qty: 90 TABLET | Refills: 2 | Status: SHIPPED | OUTPATIENT
Start: 2023-07-27

## 2023-07-27 RX ORDER — LEVOTHYROXINE SODIUM 0.05 MG/1
50 TABLET ORAL
Qty: 45 TABLET | Refills: 2 | Status: SHIPPED | OUTPATIENT
Start: 2023-07-27

## 2023-07-27 NOTE — TELEPHONE ENCOUNTER
levothyroxine 50 MCG Oral Tab          Possible duplicate: Druver to review recent actions on this medication    Sig: Take 1 tablet (50 mcg total) by mouth before breakfast. Alternating dose with 75 mcg every other day.     Disp: Not specified    Refills: 0    Start: 7/27/2023    Class: Normal    Last ordered: 8 months ago by BENSON Mcclelland     Patient comment: I need the 50mcg along with the 75mcg so i can alternate    Thyroid Supplements Protocol [id number]/27/2023 10:50 AM   Protocol Details TSH test in past 12 months    TSH value between 0.350 and 5.500 IU/ml    Appointment in past 12 or next 3 months      To be filled at: [facility] ([facility] ) - Anna Sandoval [phone], [phone]

## 2023-07-27 NOTE — TELEPHONE ENCOUNTER
Name from pharmacy: Levothyroxine Sodium 75 Kesk 53          Will file in chart as: LEVOTHYROXINE 75 MCG Oral Tab    Sig: TAKE 1 TABLET BY MOUTH BEFORE  BREAKFAST    Disp: 90 tablet    Refills: 3    Start: 7/25/2023    Class: Normal    Non-formulary For: Hypothyroidism, unspecified type    To pharmacy: Requesting 1 year supply    Last ordered: 5 months ago by BENSON Goss Last refill: 5/31/2023    Rx #: [id number]    Thyroid Supplements Protocol [id number]/25/2023 09:34 PM   Protocol Details TSH test in past 12 months    TSH value between 0.350 and 5.500 IU/ml    Appointment in past 12 or next 3 months       Name from pharmacy: Rosuvastatin Calcium 5 MG Oral Tablet         Will file in chart as: ROSUVASTATIN 5 MG Oral Tab    Sig: TAKE 1 TABLET BY MOUTH AT  NIGHT    Original sig: TAKE 1 TABLET BY MOUTH AT NIGHT    Disp: 90 tablet    Refills: 3    Start: 7/25/2023    Class: Normal    Non-formulary For: Dyslipidemia    To pharmacy: Requesting 1 year supply    Last ordered: 5 months ago by BENSON Goss Last refill: 5/31/2023    Rx #: [id number]    Cholesterol Medication Protocol Yimcvw9307/25/2023 09:34 PM   Protocol Details ALT < 80    ALT resulted within past year    Lipid panel within past 12 months    Appointment within past 12 or next 3 months      To be filled at: [facility] ([facility] ) - Anna Sandoval [phone], [phone]

## 2023-07-28 NOTE — TELEPHONE ENCOUNTER
Incoming (mail or fax):  fax  Received from:  [facility]  Documentation given to:  Triage incoming    Medical clarification request

## 2023-08-08 NOTE — ADDENDUM NOTE
Addended by: Ganesh Lane on: 8/10/2020 11:47 AM     Modules accepted: Cesilia
Addended by: Tacos Machado on: 8/10/2020 11:46 AM     Modules accepted: Orders
47-year-old female no past medical history presents with dry cough nasal congestion hiccups burning chest pain no shortness of breath no diaphoresis no nausea no vomiting. no cardiac history. independent interpretation of EKG  -  nsr no stemi no acute ischemia  independent interpretation of CXR -  no ptx no opacity not wide  no water bottle heart  trop negative x 2  Low heart score.  Ddimer negative. covid flu negative  Patient to be discharged from ED in well appearing condition. Any available test results were discussed with and printed  for patient.  Verbal instructions given, including instructions to return to ED immediately for any new, worsening, or concerning symptoms. Limitations of ED work up discussed.  Patient reports understanding of above with capacity and insight. Written discharge instructions additionally given, including follow-up plan.

## 2023-09-14 ENCOUNTER — OFFICE VISIT (OUTPATIENT)
Dept: OBGYN CLINIC | Facility: CLINIC | Age: 58
End: 2023-09-14

## 2023-09-14 VITALS
HEIGHT: 68 IN | DIASTOLIC BLOOD PRESSURE: 70 MMHG | SYSTOLIC BLOOD PRESSURE: 114 MMHG | WEIGHT: 130.06 LBS | BODY MASS INDEX: 19.71 KG/M2

## 2023-09-14 DIAGNOSIS — Z79.890 HORMONE REPLACEMENT THERAPY (HRT): ICD-10-CM

## 2023-09-14 DIAGNOSIS — N95.2 POSTMENOPAUSAL ATROPHIC VAGINITIS: Primary | ICD-10-CM

## 2023-09-14 PROCEDURE — 3008F BODY MASS INDEX DOCD: CPT | Performed by: OBSTETRICS & GYNECOLOGY

## 2023-09-14 PROCEDURE — 3078F DIAST BP <80 MM HG: CPT | Performed by: OBSTETRICS & GYNECOLOGY

## 2023-09-14 PROCEDURE — 3074F SYST BP LT 130 MM HG: CPT | Performed by: OBSTETRICS & GYNECOLOGY

## 2023-09-14 PROCEDURE — 99213 OFFICE O/P EST LOW 20 MIN: CPT | Performed by: OBSTETRICS & GYNECOLOGY

## 2023-12-26 RX ORDER — ESTRADIOL 0.1 MG/G
1 CREAM VAGINAL
Qty: 42.5 G | Refills: 3 | Status: SHIPPED | OUTPATIENT
Start: 2023-12-26

## 2024-02-09 RX ORDER — PROGESTERONE 200 MG/1
200 CAPSULE ORAL DAILY
Qty: 10 CAPSULE | Refills: 3 | Status: SHIPPED | OUTPATIENT
Start: 2024-02-09

## 2024-02-09 RX ORDER — ESTRADIOL 0.1 MG/G
1 CREAM VAGINAL
Qty: 42.5 G | Refills: 3 | Status: SHIPPED | OUTPATIENT
Start: 2024-02-09

## 2024-02-09 NOTE — TELEPHONE ENCOUNTER
Patient calling to verify that this prescription needs to be filled at the [facility] mail order pharmacy due to new insurance. Please call to advise.

## 2024-02-12 ENCOUNTER — TELEPHONE (OUTPATIENT)
Dept: OBGYN CLINIC | Facility: CLINIC | Age: 59
End: 2024-02-12

## 2024-02-12 RX ORDER — ESTRADIOL 0.05 MG/D
1 PATCH, EXTENDED RELEASE TRANSDERMAL
Qty: 24 PATCH | Refills: 0 | Status: SHIPPED | OUTPATIENT
Start: 2024-02-12

## 2024-02-12 NOTE — TELEPHONE ENCOUNTER
Patient called in regarding a refill that was not sent in thru the mail in pharmacy,  Patient states she is out of medication.   Patient request for a nurse to call to discuss.

## 2024-02-12 NOTE — TELEPHONE ENCOUNTER
Patient verified name and     Patient wanting Rx for patch to be sent to local pharmacy instead and re-ordered through [facility] mail order. Once she picks up she will contact us to have full Rx sent to mail order.

## 2024-02-13 ENCOUNTER — TELEPHONE (OUTPATIENT)
Dept: OBGYN CLINIC | Facility: CLINIC | Age: 59
End: 2024-02-13

## 2024-02-13 RX ORDER — ESTRADIOL 0.05 MG/D
1 PATCH, EXTENDED RELEASE TRANSDERMAL
Qty: 24 PATCH | Refills: 0 | Status: SHIPPED | OUTPATIENT
Start: 2024-02-15

## 2024-04-26 ENCOUNTER — OFFICE VISIT (OUTPATIENT)
Dept: INTERNAL MEDICINE CLINIC | Facility: CLINIC | Age: 59
End: 2024-04-26
Payer: COMMERCIAL

## 2024-04-26 VITALS
TEMPERATURE: 98 F | SYSTOLIC BLOOD PRESSURE: 108 MMHG | OXYGEN SATURATION: 97 % | WEIGHT: 125.81 LBS | BODY MASS INDEX: 19.07 KG/M2 | DIASTOLIC BLOOD PRESSURE: 70 MMHG | RESPIRATION RATE: 14 BRPM | HEART RATE: 65 BPM | HEIGHT: 68 IN

## 2024-04-26 DIAGNOSIS — E78.5 DYSLIPIDEMIA: ICD-10-CM

## 2024-04-26 DIAGNOSIS — E03.9 HYPOTHYROIDISM, UNSPECIFIED TYPE: ICD-10-CM

## 2024-04-26 DIAGNOSIS — Z13.29 SCREENING FOR THYROID DISORDER: ICD-10-CM

## 2024-04-26 DIAGNOSIS — E53.8 B12 DEFICIENCY: ICD-10-CM

## 2024-04-26 DIAGNOSIS — E55.9 VITAMIN D DEFICIENCY: ICD-10-CM

## 2024-04-26 DIAGNOSIS — Z13.220 SCREENING FOR CHOLESTEROL LEVEL: ICD-10-CM

## 2024-04-26 DIAGNOSIS — Z00.00 ENCOUNTER FOR ANNUAL PHYSICAL EXAM: Primary | ICD-10-CM

## 2024-04-26 RX ORDER — LEVOTHYROXINE SODIUM 0.07 MG/1
75 TABLET ORAL EVERY OTHER DAY
Qty: 45 TABLET | Refills: 3 | Status: SHIPPED | OUTPATIENT
Start: 2024-04-26

## 2024-04-26 RX ORDER — LEVOTHYROXINE SODIUM 0.05 MG/1
50 TABLET ORAL EVERY OTHER DAY
Qty: 45 TABLET | Refills: 3 | Status: SHIPPED | OUTPATIENT
Start: 2024-04-26

## 2024-04-26 RX ORDER — ROSUVASTATIN CALCIUM 5 MG/1
5 TABLET, COATED ORAL NIGHTLY
Qty: 90 TABLET | Refills: 3 | Status: SHIPPED | OUTPATIENT
Start: 2024-04-26

## 2024-04-26 NOTE — PATIENT INSTRUCTIONS
Get your labs done. You should be fasting for at least 10 hours. If you take a multivitamin with Biotin or any biotin product it should be held for 3 days prior to getting your labs done.    Continue your current medications    Get Colonoscopy as planned    Continue to see gyne    Get your mammogram when due    Follow up in 1 year or sooner as needed

## 2024-04-26 NOTE — PROGRESS NOTES
CHIEF COMPLAINT   Complete physical, med check    HPI:   Cassy Fajardo is a 58 year old female who presents for a complete physical exam, med check    Wt Readings from Last 6 Encounters:   04/26/24 125 lb 12.8 oz (57.1 kg)   09/14/23 130 lb 1.1 oz (59 kg)   05/18/23 126 lb 3.2 oz (57.2 kg)   04/19/23 123 lb 6.4 oz (56 kg)   04/18/22 129 lb 14.4 oz (58.9 kg)   10/11/21 130 lb 6.4 oz (59.1 kg)     Body mass index is 19.13 kg/m².     Diet and exercise are great. Vaccines reviewed. Wearing seat belt and no texting and driving. Feels safe at home. Pap UTD. Sees gyne. Mammo UTD. Doing self breast exams. Colon cancer screening UTD-  scheduled. No smoking. Moderation of alcohol. No skin concerns. Sees derm. Labs to be ordered.    HLD- on statin. No SE.     Thyroid- doing well with current dose. No symptoms of hyper or hypo thyroidism. No SE to medicine.     CHOLESTEROL, TOTAL (mg/dL)   Date Value   04/22/2023 182   04/21/2022 201 (H)   02/25/2021 184     HDL CHOLESTEROL (mg/dL)   Date Value   04/22/2023 84   04/21/2022 92   02/25/2021 75     LDL-CHOLESTEROL (mg/dL (calc))   Date Value   04/22/2023 79   04/21/2022 89   02/25/2021 89     AST (U/L)   Date Value   04/22/2023 19   04/21/2022 17   02/25/2021 17     ALT (U/L)   Date Value   04/22/2023 19   04/21/2022 15   02/25/2021 17        Current Outpatient Medications   Medication Sig Dispense Refill    estradiol (LYLLANA) 0.05 MG/24HR Transdermal Patch Biweekly Place 1 patch onto the skin twice a week. 24 patch 0    progesterone 200 MG Oral Cap Take 1 capsule (200 mg total) by mouth daily. Once nightly for 10 nights every 3 months. 10 capsule 3    estradiol 0.1 MG/GM Vaginal Cream Place 1 g vaginally twice a week. 42.5 g 3    levothyroxine 75 MCG Oral Tab TAKE 1 TABLET BY MOUTH BEFORE  BREAKFAST 90 tablet 2    rosuvastatin 5 MG Oral Tab TAKE 1 TABLET BY MOUTH AT NIGHT 90 tablet 2    levothyroxine 50 MCG Oral Tab Take 1 tablet (50 mcg total) by mouth before breakfast.  Alternating dose with 75 mcg every other day. 45 tablet 2    Astaxanthin 4 MG Oral Cap Take 1 capsule by mouth daily.      L-Serine Does not apply Powder Take 1 Dose by mouth daily.  2000 mg      Calcium Carbonate (CALCIUM 600 OR) Take 1 tablet by mouth daily. Plus D3 10mg/400 units and Magnesium mg      Vitamin C 500 MG Oral Tab Take 1 tablet (500 mg total) by mouth daily.      Cholecalciferol (VITAMIN D) 50 MCG (2000 UT) Oral Cap Take 1 capsule (2,000 Units total) by mouth daily.      Turmeric (QC TUMERIC COMPLEX OR) Take 1 tablet by mouth daily.      Misc Natural Products (TART CHERRY ADVANCED) Oral Cap Take 1 tablet by mouth daily.      B Complex Vitamins (B COMPLEX OR) Take 1 tablet by mouth daily.      MAGNESIUM OR Take 1 tablet by mouth daily.      omega-3 fatty acids 1000 MG Oral Cap Take 1,000 mg by mouth daily.      Multiple Vitamin (MULTIVITAMIN OR) Take 1 tablet by mouth daily.      PEG 3350-KCl-Na Bicarb-NaCl 420 g Oral Recon Soln Take as directed by physician (Patient not taking: Reported on 4/26/2024) 4000 mL 0      Allergies   Allergen Reactions    Adhesive Tape RASH    Ceclor UNKNOWN    Ragweed     Seasonal UNKNOWN      Past Medical History:    Allergic rhinitis    Amenorrhea    Date of last period; menopause    Decorative tattoo    Small tattoo on shoulder    Dyspareunia    Fibroids    Hyperlipidemia    Ovulation bleeding    Pharyngitis    Urticaria      Past Surgical History:   Procedure Laterality Date    Colonoscopy  01/17/2014    Cosmetic surgery  7/5/2022    Lower eyelid/bag removal    Other surgical history      lower eyes done      Family History   Problem Relation Age of Onset    Diabetes Father         Type 2    Dementia Father     Lipids Father     Obesity Father     Other (gout) Father         like vodka    Other (Alzheimer's  Disease) Father     Other (Bladder Cancer) Father     Cancer Father         Cancer of the larynx 2015; bladder cancer 2023    Diabetes Mother         Type 2     Lipids Mother     Dementia Mother     Other (Alzheimer's Disease) Mother     No Known Problems Son     Other (gout) Brother     Other (Guillain [place] Syndrome) Brother     Breast Cancer Paternal Aunt 42      Social History:   Social History     Socioeconomic History    Marital status:    Tobacco Use    Smoking status: Former     Current packs/day: 0.00     Types: Cigarettes     Quit date: 1991     Years since quittin.3    Smokeless tobacco: Never    Tobacco comments:     Never more than a light social smoker.  Have not smoked in over 20 years.   Vaping Use    Vaping status: Never Used   Substance and Sexual Activity    Alcohol use: Yes     Alcohol/week: 4.0 standard drinks of alcohol     Types: 2 Glasses of wine, 2 Shots of liquor per week     Comment: 3-5 drinks per week    Drug use: No     Comment: CBD, CBN and THC to help sleep (low dose)    Sexual activity: Yes     Partners: Male   Other Topics Concern    Caffeine Concern Yes     Comment: coffee, tea     Stress Concern No    Weight Concern No    Special Diet Yes     Comment: Tries to eat a health balanced diet    Exercise Yes     Comment: 3 days per week     Seat Belt Yes        REVIEW OF SYSTEMS:   GENERAL: feels well otherwise  SKIN: no complaint of any unusual skin lesions  EYES: no complaint of blurred vision or double vision  HEENT: no complaint of nasal congestion, sinus pain or ST  LUNGS: no complaint of shortness of breath with exertion  CARDIOVASCULAR: no complaint of chest pain on exertion  GI: no complaint of pain,denies heartburn  : no complaints of vaginal discharge or urinary complaints  MUSCULOSKELETAL: no complaint of back pain  NEURO: no complaint of headaches  PSYCHE: no complaint of depression or anxiety  HEMATOLOGIC: denies hx of anemia    EXAM:   /70 (BP Location: Left arm, Patient Position: Sitting, Cuff Size: adult)   Pulse 65   Temp 97.9 °F (36.6 °C) (Temporal)   Resp 14   Ht 5' 8\" (1.727 m)   Wt 125 lb 12.8  oz (57.1 kg)   LMP 01/31/2017   SpO2 97%   BMI 19.13 kg/m²   Body mass index is 19.13 kg/m².   GENERAL: well developed, well nourished,in no apparent distress  SKIN: no rashes, no suspicious lesions  HEENT: atraumatic, normocephalic,ears are clear  EYES:PERRLA, EOMI, conjunctiva are clear  NECK: supple,no adenopathy, no thyroid masses.  BREAST:DEFERRED TO GYNE  LUNGS: clear to auscultation; no rhonchi, rales, or wheezing  CARDIO: RRR without murmur  GI: no tenderness  :DEFERRED TO GYNE   MUSCULOSKELETAL: No obvious joint deformity or swelling.  Normal gait.  EXTREMITIES: no edema  NEURO: Oriented times three,cranial nerves are grossly intact,motor and sensory are grossly intact    LABS:     Lab Results   Component Value Date    WBC 4.9 04/22/2023    RBC 4.58 04/22/2023    HGB 14.5 04/22/2023    HCT 44.1 04/22/2023    MCV 96.3 04/22/2023    MCH 31.7 04/22/2023    MCHC 32.9 04/22/2023    RDW 12.4 04/22/2023     04/22/2023      Lab Results   Component Value Date    GLU 96 04/22/2023    BUN 21 04/22/2023    BUNCREA NOT APPLICABLE 04/22/2023    CREATSERUM 0.81 04/22/2023    ANIONGAP 6 07/23/2018    GFR 98 07/17/2017    GFRNAA 81 04/21/2022    GFRAA 94 04/21/2022    CA 10.3 04/22/2023    OSMOCALC 293 07/23/2018    ALKPHO 58 04/22/2023    AST 19 04/22/2023    ALT 19 04/22/2023    BILT 0.7 04/22/2023    TP 7.1 04/22/2023    ALB 4.8 04/22/2023    GLOBULIN 2.3 04/22/2023    AGRATIO 2.1 04/22/2023     04/22/2023    K 4.5 04/22/2023     04/22/2023    CO2 32 04/22/2023      Lab Results   Component Value Date    CHOLEST 182 04/22/2023    TRIG 109 04/22/2023    HDL 84 04/22/2023    LDL 79 04/22/2023    VLDL 38 (H) 07/23/2018    TCHDLRATIO 2.2 04/22/2023    NONHDLC 98 04/22/2023      Lab Results   Component Value Date    T4F 1.0 07/17/2017    TSH 1.88 04/22/2023    TSHT4 3.29 02/25/2021      Lab Results   Component Value Date    A1C 4.9 02/25/2021         ASSESSMENT AND PLAN:   1. Encounter for annual  physical exam  - Cassy Fajardo is a 57 year old female who presents for a complete physical exam.  Pap UTD and pelvic deferred to gyne per patient request. Reviewed diet and exercise.  Pt' s weight is Body mass index is 18.49 kg/m².. Recommended regular exercise. Labs ordered. Mammo UTD. Encouraged to fu with gyne when due. Vaccines reviewed. C scope scheduled.  - CBC WITH DIFFERENTIAL WITH PLATELET  - COMP METABOLIC PANEL (14)    2. Dyslipidemia  - on statin. Continue current medication. Low fat diet and exercise.  - LIPID PANEL  - rosuvastatin 5 MG Oral Tab; Take 1 tablet (5 mg total) by mouth nightly.  Dispense: 90 tablet; Refill: 3    3. Acquired hypothyroidism  - continue levothyroxine.   - repeat TSH ordered  - ASSAY, THYROID STIM HORMONE  - levothyroxine 75 MCG Oral Tab; Take 1 tablet (75 mcg total) by mouth every other day.  Dispense: 45 tablet; Refill: 3  - levothyroxine 50 MCG Oral Tab; Take 1 tablet (50 mcg total) by mouth every other day. Alternating dose with 75 mcg every other day.  Dispense: 45 tablet; Refill: 3    4. B12 deficiency  - Vitamin B12    5. Vitamin D deficiency  - VITAMIN D, 25-HYDROXY [87199][Q]    6. Screening for thyroid disorder  - TSH [E]    7. Screening for cholesterol level  - Lipid Panel          Follow up in 1 year for a med check and physical or sooner as needed  The patient indicates understanding of these issues and agrees to the plan.

## 2024-05-01 LAB
ABSOLUTE BASOPHILS: 31 CELLS/UL (ref 0–200)
ABSOLUTE EOSINOPHILS: 179 CELLS/UL (ref 15–500)
ABSOLUTE LYMPHOCYTES: 2315 CELLS/UL (ref 850–3900)
ABSOLUTE MONOCYTES: 459 CELLS/UL (ref 200–950)
ABSOLUTE NEUTROPHILS: 2117 CELLS/UL (ref 1500–7800)
ALBUMIN/GLOBULIN RATIO: 2.1 (CALC) (ref 1–2.5)
ALBUMIN: 4.5 G/DL (ref 3.6–5.1)
ALKALINE PHOSPHATASE: 42 U/L (ref 37–153)
ALT: 15 U/L (ref 6–29)
AST: 17 U/L (ref 10–35)
BASOPHILS: 0.6 %
BILIRUBIN, TOTAL: 0.9 MG/DL (ref 0.2–1.2)
BUN: 16 MG/DL (ref 7–25)
CALCIUM: 9.8 MG/DL (ref 8.6–10.4)
CARBON DIOXIDE: 30 MMOL/L (ref 20–32)
CHLORIDE: 103 MMOL/L (ref 98–110)
CHOL/HDLC RATIO: 2.2 (CALC)
CHOLESTEROL, TOTAL: 180 MG/DL
CREATININE: 0.75 MG/DL (ref 0.5–1.05)
EGFR: 91 ML/MIN/1.73M2
EOSINOPHILS: 3.5 %
GLOBULIN: 2.1 G/DL (CALC) (ref 1.9–3.7)
GLUCOSE: 92 MG/DL (ref 65–99)
HDL CHOLESTEROL: 82 MG/DL
HEMATOCRIT: 45 % (ref 35–45)
HEMOGLOBIN: 14.8 G/DL (ref 11.7–15.5)
LDL-CHOLESTEROL: 78 MG/DL (CALC)
LYMPHOCYTES: 45.4 %
MCH: 31.4 PG (ref 27–33)
MCHC: 32.9 G/DL (ref 32–36)
MCV: 95.3 FL (ref 80–100)
MONOCYTES: 9 %
MPV: 11.5 FL (ref 7.5–12.5)
NEUTROPHILS: 41.5 %
NON-HDL CHOLESTEROL: 98 MG/DL (CALC)
PLATELET COUNT: 234 THOUSAND/UL (ref 140–400)
POTASSIUM: 4.8 MMOL/L (ref 3.5–5.3)
PROTEIN, TOTAL: 6.6 G/DL (ref 6.1–8.1)
RDW: 12 % (ref 11–15)
RED BLOOD CELL COUNT: 4.72 MILLION/UL (ref 3.8–5.1)
SODIUM: 140 MMOL/L (ref 135–146)
TRIGLYCERIDES: 113 MG/DL
TSH: 2.83 MIU/L (ref 0.4–4.5)
VITAMIN B12: 498 PG/ML (ref 200–1100)
VITAMIN D, 25-OH, TOTAL: 41 NG/ML (ref 30–100)
WHITE BLOOD CELL COUNT: 5.1 THOUSAND/UL (ref 3.8–10.8)

## 2024-05-21 RX ORDER — ESTRADIOL 0.05 MG/D
PATCH, EXTENDED RELEASE TRANSDERMAL
Qty: 24 PATCH | Refills: 0 | Status: SHIPPED | OUTPATIENT
Start: 2024-05-21

## 2024-05-23 NOTE — TELEPHONE ENCOUNTER
Called patient and verified that she is using Progesterone every 3 months and states that she is.

## 2024-05-30 ENCOUNTER — TELEPHONE (OUTPATIENT)
Dept: OBGYN CLINIC | Facility: CLINIC | Age: 59
End: 2024-05-30

## 2024-05-30 NOTE — TELEPHONE ENCOUNTER
Patient is scheduled for a mammogram for tomorrow 5/31. That office is requesting an order be placed for this. Please advise.

## 2024-05-30 NOTE — TELEPHONE ENCOUNTER
Name and Date of Birth verified    Patient spoke with radiology and no longer needs order, offered to schedule for mammogram and patient states she had annual 2 weeks ago. Encouraged to call office if anything else is needed.

## 2024-05-31 ENCOUNTER — HOSPITAL ENCOUNTER (OUTPATIENT)
Dept: MAMMOGRAPHY | Age: 59
Discharge: HOME | End: 2024-05-31
Attending: INTERNAL MEDICINE
Payer: COMMERCIAL

## 2024-05-31 DIAGNOSIS — Z12.31 ENCOUNTER FOR SCREENING MAMMOGRAM FOR MALIGNANT NEOPLASM OF BREAST: ICD-10-CM

## 2024-05-31 PROCEDURE — 77067 SCR MAMMO BI INCL CAD: CPT | Performed by: OBSTETRICS & GYNECOLOGY

## 2024-05-31 PROCEDURE — 77063 BREAST TOMOSYNTHESIS BI: CPT | Performed by: OBSTETRICS & GYNECOLOGY

## 2024-07-25 PROBLEM — Z12.11 SPECIAL SCREENING FOR MALIGNANT NEOPLASM OF COLON: Status: ACTIVE | Noted: 2024-07-25

## 2024-07-25 PROBLEM — D12.0 BENIGN NEOPLASM OF CECUM: Status: ACTIVE | Noted: 2024-07-25

## 2024-08-15 RX ORDER — ESTRADIOL 0.05 MG/D
PATCH, EXTENDED RELEASE TRANSDERMAL
Qty: 24 PATCH | Refills: 0 | Status: SHIPPED | OUTPATIENT
Start: 2024-08-15

## 2024-11-07 RX ORDER — ESTRADIOL 0.05 MG/D
PATCH, EXTENDED RELEASE TRANSDERMAL
Qty: 24 PATCH | Refills: 0 | Status: SHIPPED | OUTPATIENT
Start: 2024-11-07

## 2025-01-24 RX ORDER — ESTRADIOL 0.05 MG/D
PATCH, EXTENDED RELEASE TRANSDERMAL
Qty: 24 PATCH | Refills: 0 | Status: SHIPPED | OUTPATIENT
Start: 2025-01-24

## 2025-01-24 RX ORDER — PROGESTERONE 200 MG/1
CAPSULE ORAL
Qty: 10 CAPSULE | Refills: 3 | Status: SHIPPED | OUTPATIENT
Start: 2025-01-24

## 2025-04-02 ENCOUNTER — MED REC SCAN ONLY (OUTPATIENT)
Dept: INTERNAL MEDICINE CLINIC | Facility: CLINIC | Age: 60
End: 2025-04-02

## 2025-04-09 DIAGNOSIS — E78.5 DYSLIPIDEMIA: ICD-10-CM

## 2025-04-09 DIAGNOSIS — E03.9 HYPOTHYROIDISM, UNSPECIFIED TYPE: ICD-10-CM

## 2025-04-09 RX ORDER — ROSUVASTATIN CALCIUM 5 MG/1
5 TABLET, COATED ORAL NIGHTLY
Qty: 90 TABLET | Refills: 0 | Status: SHIPPED | OUTPATIENT
Start: 2025-04-09

## 2025-04-09 RX ORDER — LEVOTHYROXINE SODIUM 75 MCG
75 TABLET ORAL EVERY OTHER DAY
Qty: 45 TABLET | Refills: 0 | Status: SHIPPED | OUTPATIENT
Start: 2025-04-09

## 2025-04-09 RX ORDER — LEVOTHYROXINE SODIUM 50 MCG
TABLET ORAL
Qty: 45 TABLET | Refills: 0 | Status: SHIPPED | OUTPATIENT
Start: 2025-04-09

## 2025-04-09 NOTE — TELEPHONE ENCOUNTER
Patient states she is aware she has to schedule pe. She says she had a death in the family therefore now is not a good time. She also states she will make the appointment at a later date.

## 2025-04-09 NOTE — TELEPHONE ENCOUNTER
Thyroid Medication Protocol Passed04/09/2025 01:12 AM   Protocol Details TSH in past 12 months    Last TSH value is normal    In person appointment or virtual visit in the past 12 mos or appointment in next 3 mos    Medication is active on med list       Cholesterol Medication Protocol Cwyuvg5904/09/2025 01:12 AM   Protocol Details ALT < 80    ALT resulted within past year    Lipid panel within past 12 months    In person appointment or virtual visit in the past 12 mos or appointment in next 3 mos    Medication is active on med list          Refills sent but she is due for annual physical exam this month, please call to schedule thanks!!

## 2025-04-15 ENCOUNTER — OFFICE VISIT (OUTPATIENT)
Dept: INTERNAL MEDICINE CLINIC | Facility: CLINIC | Age: 60
End: 2025-04-15
Payer: COMMERCIAL

## 2025-04-15 VITALS
DIASTOLIC BLOOD PRESSURE: 68 MMHG | SYSTOLIC BLOOD PRESSURE: 108 MMHG | WEIGHT: 131 LBS | HEART RATE: 62 BPM | BODY MASS INDEX: 19.85 KG/M2 | RESPIRATION RATE: 14 BRPM | OXYGEN SATURATION: 96 % | TEMPERATURE: 98 F | HEIGHT: 68 IN

## 2025-04-15 DIAGNOSIS — J06.9 VIRAL URI: ICD-10-CM

## 2025-04-15 DIAGNOSIS — J02.9 PHARYNGITIS, UNSPECIFIED ETIOLOGY: Primary | ICD-10-CM

## 2025-04-15 LAB
CONTROL LINE PRESENT WITH A CLEAR BACKGROUND (YES/NO): YES YES/NO
KIT LOT #: NORMAL NUMERIC

## 2025-04-15 PROCEDURE — 87081 CULTURE SCREEN ONLY: CPT | Performed by: NURSE PRACTITIONER

## 2025-04-15 NOTE — PATIENT INSTRUCTIONS
Keep well hydrated     You can use robitussin DM or mucinex DM as directed on the bottle for cough and chest congestion.      Use cepacol for sore throat and dry cough as needed.      Use tylenol or ibuprofen as needed for pain or fever    Continue your vitamins    Do deep breathing    Do the ear exercises below     Follow up as needed or when routine care is due       Viral Upper Respiratory Illness (Adult)    You have a viral upper respiratory illness (URI), which is another term for the common cold. This illness is contagious during the first few days. It is spread through the air by coughing and sneezing. It may also be spread by direct contact (touching the sick person and then touching your own eyes, nose, or mouth). Frequent handwashing will decrease risk of spread. Most viral illnesses go away within 7 to 10 days with rest and simple home remedies. Sometimes the illness may last for several weeks. Antibiotics will not kill a virus, and they are generally not prescribed for this condition.  Home care  If symptoms are severe, rest at home for the first 2 to 3 days. When you resume activity, don't let yourself get too tired.  Don't smoke. If you need help stopping, talk with your healthcare provider.  Avoid being exposed to cigarette smoke (yours or others’).  You may use acetaminophen or ibuprofen to control pain and fever, unless another medicine was prescribed. If you have chronic liver or kidney disease, have ever had a stomach ulcer or gastrointestinal bleeding, or are taking blood-thinning medicines, talk with your healthcare provider before using these medicines. Aspirin should never be given to anyone under 18 years of age who is ill with a viral infection or fever. It may cause severe liver or brain damage.  Your appetite may be poor, so a light diet is fine. Stay well hydrated by drinking 6 to 8 glasses of fluids per day (water, soft drinks, juices, tea, or soup). Extra fluids will help loosen  secretions in the nose and lungs.  Over-the-counter cold medicines will not shorten the length of time you’re sick, but they may be helpful for the following symptoms: cough, sore throat, and nasal and sinus congestion. If you take prescription medicines, ask your healthcare provider or pharmacist which over-the-counter medicines are safe to use. (Note: Don't use decongestants if you have high blood pressure.)  Follow-up care  Follow up with your healthcare provider, or as advised.  When to seek medical advice  Call your healthcare provider right away if any of these occur:  Cough with lots of colored sputum (mucus)  Severe headache; face, neck, or ear pain  Difficulty swallowing due to throat pain  Fever of 100.4°F (38°C) or higher, or as directed by your healthcare provider  Call 911  Call 911 if any of these occur:  Chest pain, shortness of breath, wheezing, or difficulty breathing  Coughing up blood  Very severe pain with swallowing, especially if it goes along with a muffled voice   Date Last Reviewed: 6/1/2018  © 5635-9201 [employer]. [address] [place] [address], [place] [address]. All rights reserved. This information is not intended as a substitute for professional medical care. Always follow your healthcare professional's instructions.

## 2025-04-15 NOTE — PROGRESS NOTES
Cassy Fajardo is a 59 year old female.  CHIEF COMPLAINT   Multuple symptoms    HPI:   Congestion, sore thoat, hard to swallow due to pain but still able to swallow, HA, sinus congestion, ears are full.    No sob, aches, fatigue, rashes, n/v, diarrhea,.     Covid negative.     Current Medications[1]   Past Medical History[2]   Social History:  Short Social Hx on File[3]     REVIEW OF SYSTEMS:   See HPI     EXAM:     /68   Pulse 62   Temp 98.4 °F (36.9 °C) (Temporal)   Resp 14   Ht 5' 8\" (1.727 m)   Wt 131 lb (59.4 kg)   LMP 01/31/2017   SpO2 96%   BMI 19.92 kg/m²   Body mass index is 19.92 kg/m².   GENERAL: well developed, well nourished,in no apparent distress  HEENT: atraumatic, normocephalic, right ear clear, left TM with bulging. Pharyngitis present without exudate, sinus pressure  EYES: conjunctiva are clear  NECK: supple, no adenopathy    LUNGS: clear to auscultation; no rhonchi, rales, or wheezing  CARDIO: RRR without murmur  GI:  no masses, organomegaly or tenderness  MUSCULOSKELETAL:   Normal gait.  EXTREMITIES: no edema  NEURO: Oriented times three       LABS:      Lab Results   Component Value Date    WBC 5.1 04/30/2024    RBC 4.72 04/30/2024    HGB 14.8 04/30/2024    HCT 45.0 04/30/2024    MCV 95.3 04/30/2024    MCH 31.4 04/30/2024    MCHC 32.9 04/30/2024    RDW 12.0 04/30/2024     04/30/2024      Lab Results   Component Value Date    GLU 92 04/30/2024    BUN 16 04/30/2024    BUNCREA SEE NOTE: 04/30/2024    CREATSERUM 0.75 04/30/2024    ANIONGAP 6 07/23/2018    GFR 98 07/17/2017    GFRNAA 81 04/21/2022    GFRAA 94 04/21/2022    CA 9.8 04/30/2024    OSMOCALC 293 07/23/2018    ALKPHO 42 04/30/2024    AST 17 04/30/2024    ALT 15 04/30/2024    BILT 0.9 04/30/2024    TP 6.6 04/30/2024    ALB 4.5 04/30/2024    GLOBULIN 2.1 04/30/2024    AGRATIO 2.1 04/30/2024     04/30/2024    K 4.8 04/30/2024     04/30/2024    CO2 30 04/30/2024      Lab Results   Component Value Date     CHOLEST 180 04/30/2024    TRIG 113 04/30/2024    HDL 82 04/30/2024    LDL 78 04/30/2024    VLDL 38 (H) 07/23/2018    TCHDLRATIO 2.2 04/30/2024    NONHDLC 98 04/30/2024      Lab Results   Component Value Date    T4F 1.0 07/17/2017    TSH 2.83 04/30/2024    TSHT4 3.29 02/25/2021      Lab Results   Component Value Date    A1C 4.9 02/25/2021       ASSESSMENT AND PLAN:   1. Pharyngitis, unspecified etiology  2. Viral URI  - likely viral URI with no sob. Covid negative. Rapid strep negative. Throat culture pending  - advised on supportive care.  - if sinus symptoms continue after 7 days was advised to call the office back for an abt if needed  - Rapid Strep  - GRP A STREP CULT, THROAT [4485] [Q]          The patient indicates understanding of these issues and agrees to the plan.  The patient is asked to return as needed or when routine care is due.         [1]   Current Outpatient Medications   Medication Sig Dispense Refill    levothyroxine (SYNTHROID) 75 MCG Oral Tab TAKE 1 TABLET EVERY OTHER  DAY 45 tablet 0    rosuvastatin 5 MG Oral Tab TAKE 1 TABLET NIGHTLY 90 tablet 0    levothyroxine (SYNTHROID) 50 MCG Oral Tab TAKE 1 TABLET EVERY OTHER  DAY ALTERNATING DOSE WITH  75MCG EVERY OTHER DAY 45 tablet 0    PROGESTERONE 200 MG Oral Cap TAKE 1 CAPSULE NIGHTLY FOR 10 NIGHTS EVERY 3 MONTHS 10 capsule 3    ESTRADIOL 0.05 MG/24HR Transdermal Patch Biweekly APPLY 1 PATCH ONTO THE SKIN2 TIMES A WEEK 24 patch 0    estradiol 0.1 MG/GM Vaginal Cream Place 1 g vaginally twice a week. 42.5 g 3    Astaxanthin 4 MG Oral Cap Take 1 capsule by mouth daily.      L-Serine Does not apply Powder Take 1 Dose by mouth daily.  2000 mg      Calcium Carbonate (CALCIUM 600 OR) Take 1 tablet by mouth daily. Plus D3 10mg/400 units and Magnesium mg      Vitamin C 500 MG Oral Tab Take 1 tablet (500 mg total) by mouth daily.      Cholecalciferol (VITAMIN D) 50 MCG (2000 UT) Oral Cap Take 1 capsule (2,000 Units total) by mouth daily.      Turmeric (QC  TUMERIC COMPLEX OR) Take 1 tablet by mouth daily.      Misc Natural Products (TART CHERRY ADVANCED) Oral Cap Take 1 tablet by mouth daily.      B Complex Vitamins (B COMPLEX OR) Take 1 tablet by mouth daily.      MAGNESIUM OR Take 1 tablet by mouth daily.      omega-3 fatty acids 1000 MG Oral Cap Take 1,000 mg by mouth daily.      Multiple Vitamin (MULTIVITAMIN OR) Take 1 tablet by mouth daily.     [2]   Past Medical History:   Allergic rhinitis    Amenorrhea    Date of last period; menopause    Atypical mole    Body piercing    Decorative tattoo    Small tattoo on shoulder    Dyspareunia    Fibroids    High cholesterol    Hyperlipidemia    Ovulation bleeding    Pharyngitis    Thyroid disease    Urticaria   [3]   Social History  Socioeconomic History    Marital status:    Tobacco Use    Smoking status: Former     Current packs/day: 0.00     Types: Cigarettes     Quit date: 1991     Years since quittin.3    Smokeless tobacco: Never    Tobacco comments:     Never more than a light social smoker.  Have not smoked in over 20 years.   Vaping Use    Vaping status: Never Used   Substance and Sexual Activity    Alcohol use: Yes     Alcohol/week: 4.0 standard drinks of alcohol     Types: 2 Glasses of wine, 2 Shots of liquor per week     Comment: 3-5 drinks per week    Drug use: No     Comment: CBD, CBN and THC to help sleep (low dose)    Sexual activity: Yes     Partners: Male   Other Topics Concern    Caffeine Concern Yes     Comment: coffee, tea     Stress Concern No    Weight Concern No    Special Diet Yes     Comment: Tries to eat a health balanced diet    Exercise Yes     Comment: 3 days per week     Seat Belt Yes

## 2025-04-21 RX ORDER — ESTRADIOL 0.05 MG/D
PATCH, EXTENDED RELEASE TRANSDERMAL
Qty: 24 PATCH | Refills: 0 | OUTPATIENT
Start: 2025-04-21

## 2025-04-22 ENCOUNTER — TELEPHONE (OUTPATIENT)
Dept: OBGYN CLINIC | Facility: CLINIC | Age: 60
End: 2025-04-22

## 2025-04-22 NOTE — TELEPHONE ENCOUNTER
Patient calling to find out why her estrogen patch refill was denied for her mail order.    Pls advise

## 2025-04-25 RX ORDER — ESTRADIOL 0.05 MG/D
1 PATCH, EXTENDED RELEASE TRANSDERMAL WEEKLY
Qty: 24 PATCH | Refills: 0 | Status: SHIPPED | OUTPATIENT
Start: 2025-04-25

## 2025-04-25 NOTE — TELEPHONE ENCOUNTER
Patient calling to follow up on this. Please call.     She will be out of the patch in two weeks. Scheduled an annual for the soonest opening on 5/28.

## 2025-04-28 ENCOUNTER — OFFICE VISIT (OUTPATIENT)
Dept: INTERNAL MEDICINE CLINIC | Facility: CLINIC | Age: 60
End: 2025-04-28
Payer: COMMERCIAL

## 2025-04-28 VITALS
TEMPERATURE: 97 F | SYSTOLIC BLOOD PRESSURE: 110 MMHG | OXYGEN SATURATION: 95 % | BODY MASS INDEX: 19.3 KG/M2 | HEART RATE: 60 BPM | HEIGHT: 68 IN | RESPIRATION RATE: 16 BRPM | DIASTOLIC BLOOD PRESSURE: 62 MMHG | WEIGHT: 127.31 LBS

## 2025-04-28 DIAGNOSIS — Z13.29 SCREENING FOR THYROID DISORDER: ICD-10-CM

## 2025-04-28 DIAGNOSIS — Z13.220 SCREENING FOR CHOLESTEROL LEVEL: ICD-10-CM

## 2025-04-28 DIAGNOSIS — Z00.00 ENCOUNTER FOR ANNUAL PHYSICAL EXAM: Primary | ICD-10-CM

## 2025-04-28 DIAGNOSIS — E03.9 HYPOTHYROIDISM, UNSPECIFIED TYPE: ICD-10-CM

## 2025-04-28 DIAGNOSIS — E78.5 DYSLIPIDEMIA: ICD-10-CM

## 2025-04-28 DIAGNOSIS — E55.9 VITAMIN D DEFICIENCY: ICD-10-CM

## 2025-04-28 RX ORDER — LEVOTHYROXINE SODIUM 75 UG/1
75 TABLET ORAL EVERY OTHER DAY
Qty: 45 TABLET | Refills: 3 | Status: SHIPPED | OUTPATIENT
Start: 2025-04-28

## 2025-04-28 RX ORDER — ROSUVASTATIN CALCIUM 5 MG/1
5 TABLET, COATED ORAL NIGHTLY
Qty: 90 TABLET | Refills: 3 | Status: SHIPPED | OUTPATIENT
Start: 2025-04-28

## 2025-04-28 RX ORDER — LEVOTHYROXINE SODIUM 50 UG/1
50 TABLET ORAL EVERY OTHER DAY
Qty: 45 TABLET | Refills: 3 | Status: SHIPPED | OUTPATIENT
Start: 2025-04-28

## 2025-04-28 NOTE — PATIENT INSTRUCTIONS
Flonase as needed     Prevnar 20 vaccine recommended if you have not had it.     COVID vaccine recommended if you did not get it in the fall    Get your labs done after 5/1/25. You should be fasting for at least 10 hours. If you take a multivitamin with Biotin or any biotin product it should be held for 3 days prior to getting your labs done.     Continue to see gyne    Get your mammogram done when due    Continue your same medication    Follow up in 1 year with Dr. Moore or sooner as needed

## 2025-04-28 NOTE — PROGRESS NOTES
CHIEF COMPLAINT   Complete physical, med check    HPI:   Cassy Fajardo is a 59 year old female who presents for a complete physical exam, med check    Wt Readings from Last 6 Encounters:   04/28/25 127 lb 4.8 oz (57.7 kg)   04/15/25 131 lb (59.4 kg)   06/25/24 127 lb (57.6 kg)   04/26/24 125 lb 12.8 oz (57.1 kg)   09/14/23 130 lb 1.1 oz (59 kg)   05/18/23 126 lb 3.2 oz (57.2 kg)     Body mass index is 19.36 kg/m².     Diet and exercise are great. Vaccines reviewed. Wearing seat belt and no texting and driving. Feels safe at home. Pap UTD. Sees gyne. Mammo UTD. Doing self breast exams. Colon cancer screening UTD- UTD. No smoking. Moderation of alcohol. No skin concerns. Sees derm. Labs to be ordered.    HLD- on statin. No SE.     Thyroid- doing well with current dose. No symptoms of hyper or hypo thyroidism. No SE to medicine.       CHOLESTEROL, TOTAL (mg/dL)   Date Value   04/30/2024 180   04/22/2023 182   04/21/2022 201 (H)     HDL CHOLESTEROL (mg/dL)   Date Value   04/30/2024 82   04/22/2023 84   04/21/2022 92     LDL-CHOLESTEROL (mg/dL (calc))   Date Value   04/30/2024 78   04/22/2023 79   04/21/2022 89     AST (U/L)   Date Value   04/30/2024 17   04/22/2023 19   04/21/2022 17     ALT (U/L)   Date Value   04/30/2024 15   04/22/2023 19   04/21/2022 15        Current Outpatient Medications   Medication Sig Dispense Refill    estradiol 0.05 MG/24HR Transdermal Patch Biweekly Place 1 patch onto the skin once a week. APPLY 1 PATCH ONTO THE SKIN2 TIMES A WEEK 24 patch 0    amoxicillin clavulanate 875-125 MG Oral Tab Take 1 tablet by mouth 2 (two) times daily for 10 days. 20 tablet 0    levothyroxine (SYNTHROID) 75 MCG Oral Tab TAKE 1 TABLET EVERY OTHER  DAY 45 tablet 0    rosuvastatin 5 MG Oral Tab TAKE 1 TABLET NIGHTLY 90 tablet 0    levothyroxine (SYNTHROID) 50 MCG Oral Tab TAKE 1 TABLET EVERY OTHER  DAY ALTERNATING DOSE WITH  75MCG EVERY OTHER DAY 45 tablet 0    PROGESTERONE 200 MG Oral Cap TAKE 1 CAPSULE NIGHTLY  FOR 10 NIGHTS EVERY 3 MONTHS 10 capsule 3    estradiol 0.1 MG/GM Vaginal Cream Place 1 g vaginally twice a week. 42.5 g 3    Astaxanthin 4 MG Oral Cap Take 1 capsule by mouth daily.      L-Serine Does not apply Powder Take 1 Dose by mouth daily.  2000 mg      Calcium Carbonate (CALCIUM 600 OR) Take 1 tablet by mouth daily. Plus D3 10mg/400 units and Magnesium mg      Vitamin C 500 MG Oral Tab Take 1 tablet (500 mg total) by mouth daily.      Cholecalciferol (VITAMIN D) 50 MCG (2000 UT) Oral Cap Take 1 capsule (2,000 Units total) by mouth daily.      Turmeric (QC TUMERIC COMPLEX OR) Take 1 tablet by mouth daily.      Misc Natural Products (TART CHERRY ADVANCED) Oral Cap Take 1 tablet by mouth daily.      B Complex Vitamins (B COMPLEX OR) Take 1 tablet by mouth daily.      MAGNESIUM OR Take 1 tablet by mouth daily.      omega-3 fatty acids 1000 MG Oral Cap Take 1,000 mg by mouth daily.      Multiple Vitamin (MULTIVITAMIN OR) Take 1 tablet by mouth daily.        Allergies   Allergen Reactions    Ceclor UNKNOWN    Ragweed     Seasonal UNKNOWN    Adhesive Tape RASH      Past Medical History:    Allergic rhinitis    Amenorrhea    Date of last period; menopause    Atypical mole    Body piercing    Decorative tattoo    Small tattoo on shoulder    Dyspareunia    Fibroids    High cholesterol    Hyperlipidemia    Ovulation bleeding    Pharyngitis    Thyroid disease    Urticaria      Past Surgical History:   Procedure Laterality Date    Colonoscopy  2014    Colonoscopy      Small polyps found    Cosmetic surgery  2022    Lower eyelid/bag removal      1996    Other surgical history      lower eyes done    Skin surgery      Numerous pre-cancerous moles removed.      Family History   Problem Relation Age of Onset    Alcohol and Other Disorders Associated Father     Diabetes Father         Type 2    Dementia Father         alzheimer's diagnosis - 80's    Lipids Father     Obesity Father     Other (gout)  Father         like vodka    Other (Alzheimer's  Disease) Father     Other (Bladder Cancer) Father     Cancer Father         Cancer of the larynx ; bladder cancer     Alcohol and Other Disorders Associated Mother     Diabetes Mother         Type 2    Lipids Mother     Dementia Mother         alzheimer's diagnosis - mid-60's    Other (Alzheimer's Disease) Mother     No Known Problems Son     Other (gout) Brother     Other (Guillain Houghton Syndrome) Brother     Breast Cancer Paternal Aunt 42      Social History:   Social History     Socioeconomic History    Marital status:    Tobacco Use    Smoking status: Former     Current packs/day: 0.00     Types: Cigarettes     Quit date: 1991     Years since quittin.3    Smokeless tobacco: Never    Tobacco comments:     Never more than a light social smoker.  Have not smoked in over 20 years.   Vaping Use    Vaping status: Never Used   Substance and Sexual Activity    Alcohol use: Yes     Alcohol/week: 4.0 standard drinks of alcohol     Types: 2 Glasses of wine, 2 Shots of liquor per week     Comment: 3-5 drinks per week    Drug use: No     Comment: CBD, CBN and THC to help sleep (low dose)    Sexual activity: Yes     Partners: Male   Other Topics Concern    Caffeine Concern No    Stress Concern No    Weight Concern No    Special Diet No    Exercise Yes    Seat Belt Yes        REVIEW OF SYSTEMS:   GENERAL: feels well otherwise  SKIN: no complaint of any unusual skin lesions  EYES: no complaint of blurred vision or double vision  HEENT: no complaint of nasal congestion, sinus pain or ST  LUNGS: no complaint of shortness of breath with exertion  CARDIOVASCULAR: no complaint of chest pain on exertion  GI: no complaint of pain,denies heartburn  : no complaints of vaginal discharge or urinary complaints  MUSCULOSKELETAL: no complaint of back pain  NEURO: no complaint of headaches  PSYCHE: no complaint of depression or anxiety  HEMATOLOGIC: denies hx of  anemia    EXAM:   /62 (BP Location: Left arm, Patient Position: Sitting, Cuff Size: adult)   Pulse 51   Temp 97.1 °F (36.2 °C) (Temporal)   Resp 16   Ht 5' 8\" (1.727 m)   Wt 127 lb 4.8 oz (57.7 kg)   LMP 01/31/2017   SpO2 93%   BMI 19.36 kg/m²   Body mass index is 19.36 kg/m².   GENERAL: well developed, well nourished,in no apparent distress  SKIN: no rashes, no suspicious lesions  HEENT: atraumatic, normocephalic, ears are clear  EYES:PERRLA, EOMI, conjunctiva are clear  NECK: supple,no adenopathy, no thyroid masses.  BREAST:DEFERRED TO GYNE  LUNGS: clear to auscultation; no rhonchi, rales, or wheezing  CARDIO: RRR without murmur  GI: no tenderness or masses   :DEFERRED TO GYNE   MUSCULOSKELETAL: No obvious joint deformity or swelling.  Normal gait.  EXTREMITIES: no edema or calve tenderness   NEURO: Oriented times three,cranial nerves are grossly intact,motor and sensory are grossly intact    LABS:     Lab Results   Component Value Date    WBC 5.1 04/30/2024    RBC 4.72 04/30/2024    HGB 14.8 04/30/2024    HCT 45.0 04/30/2024    MCV 95.3 04/30/2024    MCH 31.4 04/30/2024    MCHC 32.9 04/30/2024    RDW 12.0 04/30/2024     04/30/2024      Lab Results   Component Value Date    GLU 92 04/30/2024    BUN 16 04/30/2024    BUNCREA SEE NOTE: 04/30/2024    CREATSERUM 0.75 04/30/2024    ANIONGAP 6 07/23/2018    GFR 98 07/17/2017    GFRNAA 81 04/21/2022    GFRAA 94 04/21/2022    CA 9.8 04/30/2024    OSMOCALC 293 07/23/2018    ALKPHO 42 04/30/2024    AST 17 04/30/2024    ALT 15 04/30/2024    BILT 0.9 04/30/2024    TP 6.6 04/30/2024    ALB 4.5 04/30/2024    GLOBULIN 2.1 04/30/2024    AGRATIO 2.1 04/30/2024     04/30/2024    K 4.8 04/30/2024     04/30/2024    CO2 30 04/30/2024      Lab Results   Component Value Date    CHOLEST 180 04/30/2024    TRIG 113 04/30/2024    HDL 82 04/30/2024    LDL 78 04/30/2024    VLDL 38 (H) 07/23/2018    TCHDLRATIO 2.2 04/30/2024    NONHDLC 98 04/30/2024      Lab  Results   Component Value Date    T4F 1.0 07/17/2017    TSH 2.83 04/30/2024    TSHT4 3.29 02/25/2021      Lab Results   Component Value Date    A1C 4.9 02/25/2021         ASSESSMENT AND PLAN:   1. Encounter for annual physical exam  - Cassy Fajardo is a 57 year old female who presents for a complete physical exam.  Pap UTD and pelvic deferred to gyne. Reviewed diet and exercise.  Pt' s weight is Body mass index is 18.49 kg/m².. Recommended regular exercise. Labs ordered. Mammo UTD.  Vaccines reviewed. C scope UTD. Sees derm.   - CBC WITH DIFFERENTIAL WITH PLATELET  - COMP METABOLIC PANEL (14)    2. Dyslipidemia  - low fat diet and exercise  -continue statin  - rosuvastatin 5 MG Oral Tab; Take 1 tablet (5 mg total) by mouth nightly.  Dispense: 90 tablet; Refill: 3    3. Hypothyroidism, unspecified type  - stable. Get lab and continue current med  - levothyroxine (SYNTHROID) 50 MCG Oral Tab; Take 1 tablet (50 mcg total) by mouth every other day.  Dispense: 45 tablet; Refill: 3  - levothyroxine (SYNTHROID) 75 MCG Oral Tab; Take 1 tablet (75 mcg total) by mouth every other day.  Dispense: 45 tablet; Refill: 3    4. Vitamin D deficiency  - VITAMIN D, 25-HYDROXY [56070][Q]    5. Screening for cholesterol level  - Lipid Panel    6. Screening for thyroid disorder  - Assay, Thyroid Stim Hormone             Follow up in 1 year for a med check and physical or sooner as needed  The patient indicates understanding of these issues and agrees to the plan.

## 2025-04-29 ENCOUNTER — TELEPHONE (OUTPATIENT)
Dept: OBGYN CLINIC | Facility: CLINIC | Age: 60
End: 2025-04-29

## 2025-04-29 NOTE — TELEPHONE ENCOUNTER
Patient is calling pharmacy said medication on hold waiting for instruction on how to take from

## 2025-04-29 NOTE — TELEPHONE ENCOUNTER
Spoke to [facility] representative    Clarified instructions. [facility] will get it filled.     .

## 2025-04-29 NOTE — TELEPHONE ENCOUNTER
Pt name and  verified     Pt states the pharmacy will not dispense Rx due to not having instructions. Informed pt we will call pharmacy and will let her know via [employer].     Called pharmacy. Pharmacy gave provider delay line to call to clarify instructions: [phone]    Will send [employer] message that the pharmacy does have prescription available.

## 2025-04-29 NOTE — TELEPHONE ENCOUNTER
Patient said still can't get medication ,   Can you call [phone] opt 2 to clarify medication direction   Patient said should be no change to how she takes it ,    The patient was encouraged to follow a high-fiber low-fat diet.

## 2025-05-07 LAB
ABSOLUTE BASOPHILS: 39 CELLS/UL (ref 0–200)
ABSOLUTE EOSINOPHILS: 208 CELLS/UL (ref 15–500)
ABSOLUTE LYMPHOCYTES: 2815 CELLS/UL (ref 850–3900)
ABSOLUTE MONOCYTES: 611 CELLS/UL (ref 200–950)
ABSOLUTE NEUTROPHILS: 2828 CELLS/UL (ref 1500–7800)
ALBUMIN/GLOBULIN RATIO: 2 (CALC) (ref 1–2.5)
ALBUMIN: 4.5 G/DL (ref 3.6–5.1)
ALKALINE PHOSPHATASE: 44 U/L (ref 37–153)
ALT: 10 U/L (ref 6–29)
AST: 15 U/L (ref 10–35)
BASOPHILS: 0.6 %
BILIRUBIN, TOTAL: 1 MG/DL (ref 0.2–1.2)
BUN: 17 MG/DL (ref 7–25)
CALCIUM: 9.4 MG/DL (ref 8.6–10.4)
CARBON DIOXIDE: 30 MMOL/L (ref 20–32)
CHLORIDE: 104 MMOL/L (ref 98–110)
CHOL/HDLC RATIO: 2.3 (CALC)
CHOLESTEROL, TOTAL: 165 MG/DL
CREATININE: 0.68 MG/DL (ref 0.5–1.05)
EGFR: 99 ML/MIN/1.73M2
EOSINOPHILS: 3.2 %
GLOBULIN: 2.2 G/DL (CALC) (ref 1.9–3.7)
GLUCOSE: 97 MG/DL (ref 65–99)
HDL CHOLESTEROL: 71 MG/DL
HEMATOCRIT: 47 % (ref 35–45)
HEMOGLOBIN: 15 G/DL (ref 11.7–15.5)
LDL-CHOLESTEROL: 72 MG/DL (CALC)
LYMPHOCYTES: 43.3 %
MCH: 31.4 PG (ref 27–33)
MCHC: 31.9 G/DL (ref 32–36)
MCV: 98.5 FL (ref 80–100)
MONOCYTES: 9.4 %
MPV: 11.7 FL (ref 7.5–12.5)
NEUTROPHILS: 43.5 %
NON-HDL CHOLESTEROL: 94 MG/DL (CALC)
PLATELET COUNT: 298 THOUSAND/UL (ref 140–400)
POTASSIUM: 4.4 MMOL/L (ref 3.5–5.3)
PROTEIN, TOTAL: 6.7 G/DL (ref 6.1–8.1)
RDW: 11.9 % (ref 11–15)
RED BLOOD CELL COUNT: 4.77 MILLION/UL (ref 3.8–5.1)
SODIUM: 140 MMOL/L (ref 135–146)
TRIGLYCERIDES: 134 MG/DL
TSH: 2.21 MIU/L (ref 0.4–4.5)
VITAMIN D, 25-OH, TOTAL: 40 NG/ML (ref 30–100)
WHITE BLOOD CELL COUNT: 6.5 THOUSAND/UL (ref 3.8–10.8)

## 2025-05-27 RX ORDER — ESTRADIOL 0.1 MG/G
1 CREAM VAGINAL
Qty: 42.5 G | Refills: 2 | Status: SHIPPED | OUTPATIENT
Start: 2025-05-27

## 2025-06-07 ENCOUNTER — HOSPITAL ENCOUNTER (OUTPATIENT)
Dept: MAMMOGRAPHY | Age: 60
Discharge: HOME | End: 2025-06-07
Attending: INTERNAL MEDICINE
Payer: COMMERCIAL

## 2025-06-07 DIAGNOSIS — Z12.31 ENCOUNTER FOR SCREENING MAMMOGRAM FOR MALIGNANT NEOPLASM OF BREAST: ICD-10-CM

## 2025-06-07 PROCEDURE — 77063 BREAST TOMOSYNTHESIS BI: CPT | Performed by: OBSTETRICS & GYNECOLOGY

## 2025-06-07 PROCEDURE — 77067 SCR MAMMO BI INCL CAD: CPT | Performed by: OBSTETRICS & GYNECOLOGY

## 2025-06-25 ENCOUNTER — OFFICE VISIT (OUTPATIENT)
Dept: OBGYN CLINIC | Facility: CLINIC | Age: 60
End: 2025-06-25
Payer: COMMERCIAL

## 2025-06-25 VITALS
SYSTOLIC BLOOD PRESSURE: 116 MMHG | WEIGHT: 128.38 LBS | BODY MASS INDEX: 19.46 KG/M2 | HEIGHT: 68 IN | DIASTOLIC BLOOD PRESSURE: 74 MMHG

## 2025-06-25 DIAGNOSIS — N95.0 POSTMENOPAUSAL BLEEDING: ICD-10-CM

## 2025-06-25 DIAGNOSIS — Z01.419 ENCOUNTER FOR WELL WOMAN EXAM WITH ROUTINE GYNECOLOGICAL EXAM: Primary | ICD-10-CM

## 2025-06-25 DIAGNOSIS — Z13.6 SCREENING FOR CARDIOVASCULAR CONDITION: ICD-10-CM

## 2025-06-25 DIAGNOSIS — Z78.0 MENOPAUSE: ICD-10-CM

## 2025-06-25 DIAGNOSIS — Z12.31 ENCOUNTER FOR SCREENING MAMMOGRAM FOR BREAST CANCER: ICD-10-CM

## 2025-06-25 PROCEDURE — 99396 PREV VISIT EST AGE 40-64: CPT | Performed by: OBSTETRICS & GYNECOLOGY

## 2025-06-25 RX ORDER — ESTRADIOL 0.05 MG/D
1 PATCH, EXTENDED RELEASE TRANSDERMAL WEEKLY
Qty: 24 PATCH | Refills: 4 | Status: SHIPPED | OUTPATIENT
Start: 2025-06-25

## 2025-06-25 RX ORDER — PROGESTERONE 100 MG/1
100 CAPSULE ORAL NIGHTLY
Qty: 90 CAPSULE | Refills: 4 | Status: SHIPPED | OUTPATIENT
Start: 2025-06-25

## 2025-06-25 NOTE — PROGRESS NOTES
[facility]  Obstetrics and Gynecology  Gynecology Visit    Chief Complaint   Patient presents with    Annual           Cassy Fajardo is a 59 year old female who presents for annual.    LMP: postmenopausal.    Menses regular: n/a.    Menstrual flow normal: n/a.    Birth control or HRT:  minivelle/progesterone.   Refill yes  Last Pap Smear: .  Any history of abnormal paps: no hx abn   Last MMG: up to date  Any Medication Refills needed today?: no  Sleep: 7-8 hours.    Diet: balanced.    Exercise: yes.   Screening labs/Blood work today: no.     Colonoscopy (if over 46 y/o): up to date.   Gardasil:(age 9-46 y/o) n/a.   Genetic Cancer screen (if indicated): no.   Flu (Aug-April): n/a.TDAP (every 10 years) n/a.      Additional Problems/concerns: patient complains of full 8 day cycle with the progesterone and spotting without.spotting.      Next Appt: annual scheduled    Immunization History   Administered Date(s) Administered    Albuterol Unit Dose, Per 1mg, Nebu 2013    Covid-19 Vaccine Moderna Bivalent 50mcg/0.5mL 12+ years 2023    Covid-19 Vaccine Pfizer 30 mcg/0.3 ml 2021, 2021, 2021    Covid-19 Vaccine Pfizer Bivalent 30mcg/0.3mL 10/21/2022    Covid-19 Vaccine Pfizer Deshaun-Sucrose 30 mcg/0.3 ml 2022    FLULAVAL 6 months & older 0.5 ml Prefilled syringe (21952) 10/11/2021    FLUZONE 6 months and older PFS 0.5 ml (84334) 10/11/2021, 2023    Influenza 10/05/2016, 10/17/2017, 2018, 2019, 2020, 10/21/2022, 10/26/2024    Influenza(Afluria)0.5ml QIV PFS 10/10/2019    Pfizer Covid-19 Vaccine 30mcg/0.3ml 12yrs+ 2023    RSV, recombinant, RSVpreF, adjuvanted (Arexvy) 2023    TDAP 2015, 2025    Zoster Vaccine Recombinant Adjuvanted (Shingrix) 2021, 2021       Medications - Current[1]    Allergies[2]    OB History    Para Term  AB Living   1 1 1 0 0 1   SAB IAB Ectopic Multiple Live Births   0 0 0 0 0      #  Outcome Date GA Lbr Satnam/2nd Weight Sex Type Anes PTL Lv   1 Term      NORMAL SPONT          Gyn History       No data recorded      Latest Ref Rng & Units 2023     4:43 PM 2018     6:48 PM   RECENT PAP RESULTS   INTERPRETATION/RESULT:  Negative for intraepithelial lesion or malignancy. Atrophic pattern; predominantly parabasal cells  Negative for intraepithelial lesion or malignancy    HPV Negative Negative  Negative            Past Medical History[3]    Past Surgical History[4]    Family History[5]     Tobacco  Allergies         Social History     Socioeconomic History    Marital status:      Spouse name: Not on file    Number of children: Not on file    Years of education: Not on file    Highest education level: Not on file   Occupational History    Not on file   Tobacco Use    Smoking status: Former     Current packs/day: 0.00     Types: Cigarettes     Quit date: 1991     Years since quittin.5    Smokeless tobacco: Never    Tobacco comments:     Never more than a light social smoker.  Have not smoked in over 20 years.   Vaping Use    Vaping status: Never Used   Substance and Sexual Activity    Alcohol use: Yes     Alcohol/week: 4.0 standard drinks of alcohol     Types: 2 Glasses of wine, 2 Shots of liquor per week     Comment: 3-5 drinks per week    Drug use: No     Comment: CBD, CBN and THC to help sleep (low dose)    Sexual activity: Yes     Partners: Male   Other Topics Concern     Service Not Asked    Blood Transfusions Not Asked    Caffeine Concern No    Occupational Exposure Not Asked    Hobby Hazards Not Asked    Sleep Concern Not Asked    Stress Concern No    Weight Concern No    Special Diet No    Back Care Not Asked    Exercise Yes    Bike Helmet Not Asked    Seat Belt Yes    Self-Exams Not Asked   Social History Narrative    Not on file     Social Drivers of Health     Food Insecurity: Not on file   Transportation Needs: Not on file   Stress: Not on file   Housing  Stability: Not on file     /74   Ht 5' 8\" (1.727 m)   Wt 128 lb 6.4 oz (58.2 kg)   LMP 01/31/2017   BMI 19.52 kg/m²     Wt Readings from Last 3 Encounters:   06/25/25 128 lb 6.4 oz (58.2 kg)   04/28/25 127 lb 4.8 oz (57.7 kg)   04/15/25 131 lb (59.4 kg)         Health Maintenance   Topic Date Due    Influenza Vaccine (1) 08/01/2021    Screen for Cervical Cancer 11/05/2021    DTaP,Tdap and Td Vaccines (3 - Td or Tdap) 03/18/2025    Hepatitis C Screening Completed    HIV Screening Completed    COVID-19 Vaccine Completed     Review of Systems   General: Present- Feeling well. Not Present- Chills, Fever, Weight Gain and Weight Loss.  HEENT: Not Present- Headache and Sore Throat.  Respiratory: Not Present- Cough, Difficulty Breathing, Hemoptysis and Sputum Production.  Cardiovascular: Not Present- Chest Pain, Elevated Blood Pressure, Fainting / Blacking Out and Shortness of Breath.  Gastrointestinal: Not Present- Constipation, Diarrhea, Nausea and Vomiting.  Female Genitourinary: Not Present- Discharge, Dysuria and Frequency.  Musculoskeletal: Not Present- Leg Cramps and Swelling of Extremities.  Neurological: Not Present- Dizziness and Headaches.  Psychiatric: Not Present- Anxiety and Depression.  Endocrine: Not Present- Appetite Changes, Hair Changes and Thyroid Problems.  Hematology: Not Present- Easy Bruising and Excessive bleeding.  All other systems negative     Physical Exam The physical exam findings are as follows:     General   Mental Status - Alert. General Appearance - Cooperative. Orientation - Oriented X4. Build & Nutrition - Well nourished.    Head and Neck  Thyroid   Gland Characteristics - normal size and consistency.    Chest and Lung Exam   Inspection:   Chest Wall: - Normal.  Percussion:   Quality and Intensity: - Percussion normal.  Palpation: - Palpation normal.  Auscultation:   Breath sounds: - Normal.  Adventitious sounds: - No Adventitious sounds.    Breast   Nipples: Characteristics -  Bilateral - Normal. Discharge - Bilateral - None.  Breast - Bilateral - Symmetric.    Cardiovascular   Auscultation: Rhythm - Regular. Heart Sounds - Normal heart sounds.  Murmurs & Other Heart Sounds: Auscultation of the heart reveals - No Murmurs.    Abdomen   Inspection: Inspection of the abdomen reveals - No Hernias. Incisional scars - no incisional scars.  Palpation/Percussion: Palpation and Percussion of the abdomen reveal - Non Tender and No Palpable abdominal masses.  Liver: - Normal.  Auscultation: Auscultation of the abdomen reveals - Bowel sounds normal.    Female Genitourinary     External Genitalia   Perineum - Normal. Bartholin's Gland - Bilateral - Normal. Clitoris - Normal.  Introitus: Characteristics - No Cystocele, Enterocele or Rectocele. Discharge - None.  Labia Majora: Lesions - Bilateral - None. Characteristics - Bilateral - Normal.  Labia Minora: Lesions - Bilateral - None. Characteristics - Bilateral - Normal.  Urethra: Characteristics - Normal. Discharge - None.  [place] Gland - Bilateral - Normal.  Vulva: Characteristics - Normal. Lesions - None.    Speculum & Bimanual   Vagina:   Vaginal Wall: - Normal.  Vaginal Lesions - None. Vaginal Mucosa - Normal.  Cervix: Characteristics - No Motion tenderness. Discharge - None.  Uterus: Characteristics - Normal. Position - Midposition.  Adnexa: Characteristics - Bilateral - Normal. Masses - No Adnexal Masses.    Rectal   Anorectal Exam: External - normal external exam.    Peripheral Vascular   Upper Extremity:   Palpation: - Pulses bilaterally normal.  Lower Extremity: Inspection - Bilateral - Inspection Normal.  Palpation: Edema - Bilateral - No edema.    Neurologic   Mental Status: - Normal.    Lymphatic  General Lymphatics   Description - Normal .       1. Encounter for well woman exam with routine gynecological exam    2. Encounter for screening mammogram for breast cancer  - [facility] JORDAN 2D+3D SCREENING BILAT (CPT=77067/09331); Future    3. Screening  for cardiovascular condition  - CT CALCIUM SCORING; Future    4. Menopause    5. Postmenopausal bleeding       - Cervical exam completed  - Screening mammogram ordered  - CT heart scan ordered  - Progesterone dose lowered to 100mg, patient instructed to take regularly to help with spotting while on progesterone. Reports improvement in menopausal symptoms with HRT.  - Patient scheduled for hysteroscopy due to post menopausal bleeding, polyp suspected    Paula cortez                       [1]   Current Outpatient Medications:     estradiol 0.05 MG/24HR Transdermal Patch Biweekly, Place 1 patch onto the skin once a week. APPLY 1 PATCH ONTO THE SKIN2 TIMES A WEEK, Disp: 24 patch, Rfl: 4    progesterone 100 MG Oral Cap, Take 1 capsule (100 mg total) by mouth nightly. Nightly, Disp: 90 capsule, Rfl: 4    ESTRADIOL 0.1 MG/GM Vaginal Cream, PLACE 1 GRAM VAGINALLY 2   TIMES A WEEK, Disp: 42.5 g, Rfl: 2    rosuvastatin 5 MG Oral Tab, Take 1 tablet (5 mg total) by mouth nightly., Disp: 90 tablet, Rfl: 3    levothyroxine (SYNTHROID) 50 MCG Oral Tab, Take 1 tablet (50 mcg total) by mouth every other day., Disp: 45 tablet, Rfl: 3    levothyroxine (SYNTHROID) 75 MCG Oral Tab, Take 1 tablet (75 mcg total) by mouth every other day., Disp: 45 tablet, Rfl: 3    Astaxanthin 4 MG Oral Cap, Take 1 capsule by mouth daily., Disp: , Rfl:     L-Serine Does not apply Powder, Take 1 Dose by mouth daily.  2000 mg, Disp: , Rfl:     Calcium Carbonate (CALCIUM 600 OR), Take 1 tablet by mouth daily. Plus D3 10mg/400 units and Magnesium mg, Disp: , Rfl:     Vitamin C 500 MG Oral Tab, Take 1 tablet (500 mg total) by mouth daily., Disp: , Rfl:     Cholecalciferol (VITAMIN D) 50 MCG (2000 UT) Oral Cap, Take 1 capsule (2,000 Units total) by mouth daily., Disp: , Rfl:     Turmeric (QC TUMERIC COMPLEX OR), Take 1 tablet by mouth daily., Disp: , Rfl:     Misc Natural Products (TART CHERRY ADVANCED) Oral Cap, Take 1 tablet by mouth daily.,  Disp: , Rfl:     B Complex Vitamins (B COMPLEX OR), Take 1 tablet by mouth daily., Disp: , Rfl:     MAGNESIUM OR, Take 1 tablet by mouth daily., Disp: , Rfl:     omega-3 fatty acids 1000 MG Oral Cap, Take 1,000 mg by mouth daily., Disp: , Rfl:     Multiple Vitamin (MULTIVITAMIN OR), Take 1 tablet by mouth daily., Disp: , Rfl:   [2]   Allergies  Allergen Reactions    Ceclor UNKNOWN    Ragweed     Seasonal UNKNOWN    Adhesive Tape RASH   [3]   Past Medical History:   Allergic rhinitis    Amenorrhea    Date of last period; menopause    Atypical mole    Body piercing    Decorative tattoo    Small tattoo on shoulder    Dyspareunia    Fibroids    High cholesterol    Hyperlipidemia    Ovulation bleeding    Pharyngitis    Thyroid disease    Urticaria   [4]   Past Surgical History:  Procedure Laterality Date    Colonoscopy  2014    Colonoscopy      Small polyps found    Cosmetic surgery  2022    Lower eyelid/bag removal      1996    Other surgical history      lower eyes done    Skin surgery      Numerous pre-cancerous moles removed.   [5]   Family History  Problem Relation Age of Onset    Alcohol and Other Disorders Associated Father     Diabetes Father         Type 2    Dementia Father         alzheimer's diagnosis - 80's    Lipids Father     Obesity Father     Other (gout) Father         like vodka    Other (Alzheimer's  Disease) Father     Other (Bladder Cancer) Father     Cancer Father         Cancer of the larynx ; bladder cancer     Alcohol and Other Disorders Associated Mother     Diabetes Mother         Type 2    Lipids Mother     Dementia Mother         alzheimer's diagnosis - mid-60's    Other (Alzheimer's Disease) Mother     No Known Problems Son     Other (gout) Brother     Other (Guillain Arcadia Syndrome) Brother     Breast Cancer Paternal Aunt 42

## 2025-06-27 ENCOUNTER — TELEPHONE (OUTPATIENT)
Dept: OBGYN CLINIC | Facility: CLINIC | Age: 60
End: 2025-06-27

## 2025-06-27 NOTE — TELEPHONE ENCOUNTER
Pharmacy need instruction clarification.    Medication Quantity Refills Start End   estradiol 0.05 MG/24HR Transdermal Patch Biweekly 24 patch 4 6/25/2025 --   Sig:   Place 1 patch onto the skin once a week. APPLY 1 PATCH ONTO THE SKIN2 TIMES A WEEK     Route:   Transdermal     Order #:   [id number]

## 2025-07-07 RX ORDER — ESTRADIOL 0.05 MG/D
PATCH, EXTENDED RELEASE TRANSDERMAL
Qty: 24 PATCH | Refills: 0 | OUTPATIENT
Start: 2025-07-07

## 2025-07-20 ENCOUNTER — ORDER TRANSCRIPTION (OUTPATIENT)
Dept: ADMINISTRATIVE | Facility: HOSPITAL | Age: 60
End: 2025-07-20

## 2025-07-20 DIAGNOSIS — Z13.6 SCREENING FOR CARDIOVASCULAR CONDITION: Primary | ICD-10-CM

## 2025-07-28 ENCOUNTER — HOSPITAL ENCOUNTER (OUTPATIENT)
Dept: CT IMAGING | Facility: HOSPITAL | Age: 60
Discharge: HOME | End: 2025-07-28
Attending: INTERNAL MEDICINE

## 2025-07-28 ENCOUNTER — HOSPITAL ENCOUNTER (OUTPATIENT)
Dept: CT IMAGING | Facility: HOSPITAL | Age: 60
End: 2025-07-28
Attending: OBSTETRICS & GYNECOLOGY

## 2025-07-28 DIAGNOSIS — Z13.6 SCREENING FOR CARDIOVASCULAR CONDITION: ICD-10-CM

## (undated) DIAGNOSIS — E78.5 DYSLIPIDEMIA: ICD-10-CM

## (undated) DIAGNOSIS — Z12.31 ENCOUNTER FOR MAMMOGRAM TO ESTABLISH BASELINE MAMMOGRAM: Primary | ICD-10-CM

## (undated) DIAGNOSIS — E03.9 ACQUIRED HYPOTHYROIDISM: ICD-10-CM